# Patient Record
Sex: FEMALE | Race: WHITE | Employment: OTHER | URBAN - METROPOLITAN AREA
[De-identification: names, ages, dates, MRNs, and addresses within clinical notes are randomized per-mention and may not be internally consistent; named-entity substitution may affect disease eponyms.]

---

## 2022-03-27 ENCOUNTER — APPOINTMENT (OUTPATIENT)
Dept: CT IMAGING | Age: 87
End: 2022-03-27
Attending: EMERGENCY MEDICINE
Payer: MEDICARE

## 2022-03-27 ENCOUNTER — HOSPITAL ENCOUNTER (OUTPATIENT)
Age: 87
Setting detail: OBSERVATION
Discharge: HOME HEALTH | End: 2022-03-29
Attending: EMERGENCY MEDICINE | Admitting: HOSPITALIST
Payer: MEDICARE

## 2022-03-27 ENCOUNTER — APPOINTMENT (OUTPATIENT)
Dept: GENERAL RADIOLOGY | Age: 87
End: 2022-03-27
Attending: EMERGENCY MEDICINE
Payer: MEDICARE

## 2022-03-27 DIAGNOSIS — S00.03XA CONTUSION OF SCALP, INITIAL ENCOUNTER: Primary | ICD-10-CM

## 2022-03-27 DIAGNOSIS — R09.02 HYPOXIA: ICD-10-CM

## 2022-03-27 DIAGNOSIS — S22.32XA CLOSED FRACTURE OF ONE RIB OF LEFT SIDE, INITIAL ENCOUNTER: ICD-10-CM

## 2022-03-27 DIAGNOSIS — S20.212A CONTUSION OF LEFT CHEST WALL, INITIAL ENCOUNTER: ICD-10-CM
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
## 2022-03-27 LAB
ALBUMIN SERPL-MCNC: 3.6 G/DL (ref 3.5–5)
ALBUMIN/GLOB SERPL: 0.8 {RATIO} (ref 1.1–2.2)
ALP SERPL-CCNC: 76 U/L (ref 45–117)
ALT SERPL-CCNC: 24 U/L (ref 12–78)
ANION GAP SERPL CALC-SCNC: 3 MMOL/L (ref 5–15)
AST SERPL-CCNC: 50 U/L (ref 15–37)
BASOPHILS # BLD: 0 K/UL (ref 0–0.1)
BASOPHILS NFR BLD: 1 % (ref 0–1)
BILIRUB SERPL-MCNC: 0.4 MG/DL (ref 0.2–1)
BUN SERPL-MCNC: 23 MG/DL (ref 6–20)
BUN/CREAT SERPL: 32 (ref 12–20)
CALCIUM SERPL-MCNC: 8.9 MG/DL (ref 8.5–10.1)
CHLORIDE SERPL-SCNC: 100 MMOL/L (ref 97–108)
CO2 SERPL-SCNC: 33 MMOL/L (ref 21–32)
COMMENT, HOLDF: NORMAL
CREAT SERPL-MCNC: 0.72 MG/DL (ref 0.55–1.02)
DIFFERENTIAL METHOD BLD: NORMAL
EOSINOPHIL # BLD: 0 K/UL (ref 0–0.4)
EOSINOPHIL NFR BLD: 1 % (ref 0–7)
ERYTHROCYTE [DISTWIDTH] IN BLOOD BY AUTOMATED COUNT: 13.3 % (ref 11.5–14.5)
GLOBULIN SER CALC-MCNC: 4.6 G/DL (ref 2–4)
GLUCOSE SERPL-MCNC: 93 MG/DL (ref 65–100)
HCT VFR BLD AUTO: 41.2 % (ref 35–47)
HGB BLD-MCNC: 13.3 G/DL (ref 11.5–16)
IMM GRANULOCYTES # BLD AUTO: 0 K/UL (ref 0–0.04)
IMM GRANULOCYTES NFR BLD AUTO: 0 % (ref 0–0.5)
LYMPHOCYTES # BLD: 1 K/UL (ref 0.8–3.5)
LYMPHOCYTES NFR BLD: 26 % (ref 12–49)
MCH RBC QN AUTO: 30.9 PG (ref 26–34)
MCHC RBC AUTO-ENTMCNC: 32.3 G/DL (ref 30–36.5)
MCV RBC AUTO: 95.8 FL (ref 80–99)
MONOCYTES # BLD: 0.3 K/UL (ref 0–1)
MONOCYTES NFR BLD: 9 % (ref 5–13)
NEUTS SEG # BLD: 2.5 K/UL (ref 1.8–8)
NEUTS SEG NFR BLD: 63 % (ref 32–75)
NRBC # BLD: 0 K/UL (ref 0–0.01)
NRBC BLD-RTO: 0 PER 100 WBC
PLATELET # BLD AUTO: 174 K/UL (ref 150–400)
PMV BLD AUTO: 11 FL (ref 8.9–12.9)
POTASSIUM SERPL-SCNC: 4.1 MMOL/L (ref 3.5–5.1)
PROCALCITONIN SERPL-MCNC: <0.05 NG/ML
PROT SERPL-MCNC: 8.2 G/DL (ref 6.4–8.2)
RBC # BLD AUTO: 4.3 M/UL (ref 3.8–5.2)
SAMPLES BEING HELD,HOLD: NORMAL
SODIUM SERPL-SCNC: 136 MMOL/L (ref 136–145)
WBC # BLD AUTO: 3.9 K/UL (ref 3.6–11)

## 2022-03-27 PROCEDURE — 71101 X-RAY EXAM UNILAT RIBS/CHEST: CPT

## 2022-03-27 PROCEDURE — 80053 COMPREHEN METABOLIC PANEL: CPT

## 2022-03-27 PROCEDURE — 74011250636 HC RX REV CODE- 250/636: Performed by: EMERGENCY MEDICINE

## 2022-03-27 PROCEDURE — 85025 COMPLETE CBC W/AUTO DIFF WBC: CPT

## 2022-03-27 PROCEDURE — 65390000012 HC CONDITION CODE 44 OBSERVATION

## 2022-03-27 PROCEDURE — 65270000029 HC RM PRIVATE

## 2022-03-27 PROCEDURE — 96374 THER/PROPH/DIAG INJ IV PUSH: CPT

## 2022-03-27 PROCEDURE — 74011250637 HC RX REV CODE- 250/637: Performed by: HOSPITALIST

## 2022-03-27 PROCEDURE — 74011000250 HC RX REV CODE- 250: Performed by: HOSPITALIST

## 2022-03-27 PROCEDURE — 70450 CT HEAD/BRAIN W/O DYE: CPT

## 2022-03-27 PROCEDURE — 99285 EMERGENCY DEPT VISIT HI MDM: CPT

## 2022-03-27 PROCEDURE — 84145 PROCALCITONIN (PCT): CPT

## 2022-03-27 RX ORDER — SODIUM CHLORIDE 0.9 % (FLUSH) 0.9 %
5-40 SYRINGE (ML) INJECTION AS NEEDED
Status: DISCONTINUED | OUTPATIENT
Start: 2022-03-27 | End: 2022-03-29 | Stop reason: HOSPADM

## 2022-03-27 RX ORDER — ACETAMINOPHEN 650 MG/1
650 SUPPOSITORY RECTAL
Status: DISCONTINUED | OUTPATIENT
Start: 2022-03-27 | End: 2022-03-29 | Stop reason: HOSPADM

## 2022-03-27 RX ORDER — ACETAMINOPHEN 325 MG/1
650 TABLET ORAL EVERY 8 HOURS
Status: DISCONTINUED | OUTPATIENT
Start: 2022-03-27 | End: 2022-03-29 | Stop reason: HOSPADM

## 2022-03-27 RX ORDER — ENOXAPARIN SODIUM 100 MG/ML
30 INJECTION SUBCUTANEOUS DAILY
Status: DISCONTINUED | OUTPATIENT
Start: 2022-03-28 | End: 2022-03-27

## 2022-03-27 RX ORDER — LIDOCAINE 4 G/100G
1 PATCH TOPICAL EVERY 24 HOURS
Status: DISCONTINUED | OUTPATIENT
Start: 2022-03-27 | End: 2022-03-29 | Stop reason: HOSPADM

## 2022-03-27 RX ORDER — ASCORBIC ACID 500 MG
500 TABLET ORAL DAILY
Status: DISCONTINUED | OUTPATIENT
Start: 2022-03-28 | End: 2022-03-29 | Stop reason: HOSPADM

## 2022-03-27 RX ORDER — POLYETHYLENE GLYCOL 3350 17 G/17G
17 POWDER, FOR SOLUTION ORAL DAILY PRN
Status: DISCONTINUED | OUTPATIENT
Start: 2022-03-27 | End: 2022-03-29 | Stop reason: HOSPADM

## 2022-03-27 RX ORDER — ONDANSETRON 4 MG/1
4 TABLET, ORALLY DISINTEGRATING ORAL
Status: DISCONTINUED | OUTPATIENT
Start: 2022-03-27 | End: 2022-03-29 | Stop reason: HOSPADM

## 2022-03-27 RX ORDER — ACETAMINOPHEN 325 MG/1
650 TABLET ORAL
Status: DISCONTINUED | OUTPATIENT
Start: 2022-03-27 | End: 2022-03-29 | Stop reason: HOSPADM

## 2022-03-27 RX ORDER — ONDANSETRON 2 MG/ML
4 INJECTION INTRAMUSCULAR; INTRAVENOUS
Status: DISCONTINUED | OUTPATIENT
Start: 2022-03-27 | End: 2022-03-29 | Stop reason: HOSPADM

## 2022-03-27 RX ORDER — KETOROLAC TROMETHAMINE 30 MG/ML
15 INJECTION, SOLUTION INTRAMUSCULAR; INTRAVENOUS
Status: COMPLETED | OUTPATIENT
Start: 2022-03-27 | End: 2022-03-27

## 2022-03-27 RX ORDER — TRAMADOL HYDROCHLORIDE 50 MG/1
50 TABLET ORAL
Status: DISCONTINUED | OUTPATIENT
Start: 2022-03-27 | End: 2022-03-29 | Stop reason: HOSPADM

## 2022-03-27 RX ORDER — SODIUM CHLORIDE 0.9 % (FLUSH) 0.9 %
5-40 SYRINGE (ML) INJECTION EVERY 8 HOURS
Status: DISCONTINUED | OUTPATIENT
Start: 2022-03-27 | End: 2022-03-29 | Stop reason: HOSPADM

## 2022-03-27 RX ORDER — ESCITALOPRAM OXALATE 10 MG/1
10 TABLET ORAL EVERY EVENING
Status: DISCONTINUED | OUTPATIENT
Start: 2022-03-27 | End: 2022-03-29 | Stop reason: HOSPADM

## 2022-03-27 RX ORDER — KETOROLAC TROMETHAMINE 10 MG/1
10 TABLET, FILM COATED ORAL
Qty: 16 TABLET | Refills: 0 | Status: SHIPPED | OUTPATIENT
Start: 2022-03-27 | End: 2022-03-29

## 2022-03-27 RX ADMIN — ESCITALOPRAM 10 MG: 10 TABLET, FILM COATED ORAL at 18:16

## 2022-03-27 RX ADMIN — ACETAMINOPHEN 650 MG: 325 TABLET ORAL at 16:40

## 2022-03-27 RX ADMIN — KETOROLAC TROMETHAMINE 15 MG: 30 INJECTION, SOLUTION INTRAMUSCULAR; INTRAVENOUS at 13:02

## 2022-03-27 RX ADMIN — ACETAMINOPHEN 650 MG: 325 TABLET ORAL at 21:40

## 2022-03-27 RX ADMIN — SODIUM CHLORIDE, PRESERVATIVE FREE 10 ML: 5 INJECTION INTRAVENOUS at 17:27

## 2022-03-27 RX ADMIN — SODIUM CHLORIDE, PRESERVATIVE FREE 10 ML: 5 INJECTION INTRAVENOUS at 21:40

## 2022-03-27 NOTE — PROGRESS NOTES
Has a final transfer review been performed? Yes    Reason for Admission: hypoxia secondary to rib fracture  Patient comes from: Home  Mental Status: Alert and oriented  ADL:partial assistance  Ambulation:ambulates with: walker  Pertinent Info/Safety Concerns: falls  COVID Status: Not Tested  MEWS Score: 2  Vitals:    03/27/22 1252 03/27/22 1352 03/27/22 1407 03/27/22 1622   BP: 125/71 109/66 120/64 137/79   Pulse: 84 76 77 82   Resp: 21 15 14 24   Temp:    98.9 °F (37.2 °C)   SpO2: 90% 91% 92% 93%   Weight:         Transport mode:ED stretcher    TRANSFER - OUT REPORT:    Report consisted of patient's Situation, Background, Assessment and   Recommendations(SBAR).      Lines:   Peripheral IV 03/27/22 Right Antecubital (Active)

## 2022-03-27 NOTE — PROGRESS NOTES
TRANSFER - IN REPORT:    Verbal report received from WALLACE Welsh on [address]  being received from ED for routine progression of care      Report consisted of patients Situation, Background, Assessment and   Recommendations(SBAR). Information from the following report(s) SBAR, Kardex, ED Summary and MAR was reviewed with the receiving nurse. Opportunity for questions and clarification was provided. Assessment completed upon patients arrival to unit and care assumed.

## 2022-03-27 NOTE — ED NOTES
Left message to call back, please relay message below. Pt is to  prescription as soon as possible, and also sip on gatorade until midnight. Please ask which pharmacy we should send it to    RN called Diya W Nathen Barber to inform of sbar note in.

## 2022-03-27 NOTE — ED PROVIDER NOTES
This is a 80-year-old female who states that this morning she accidentally tripped and fell. She struck the top of her head but does not think she was knocked out. She is complaining of a large hematoma on the scalp. She is also complaining of pain over the left lower ribs and thinks she may have broken a rib. She does not remember actually striking her chest.  She denies any back pain. There is no shortness of breath. She denies any nausea or vomiting. She has no other acute complaint or injury secondary to the fall including pain in her hips or back or extremities.            Past Medical History:   Diagnosis Date    Arthritis     Coagulation defects 1969    BLEEDS EASILY; TRANSFUSED AFTER HYSTERECTOMY    GERD (gastroesophageal reflux disease)     History and physical examination, occupation     Hypertension     Unspecified adverse effect of anesthesia     AGGITATION, ROOM SPINNING, O/P REQUIRED OVERNIGHT STAY    Unspecified adverse effect of anesthesia 1970s    STOPPED BREATHING DURING BREAST BIOPSY    Vertigo        Past Surgical History:   Procedure Laterality Date    BREAST SURGERY PROCEDURE UNLISTED  X5 R, X1 L  1970s    CYSTS     HX GI  1996    ESOPHAGEAL DILATION    HX HEENT  2006    CATARACTS   R&L    HX HYSTERECTOMY  1969    HX ORTHOPAEDIC  2000    R TOTAL KNEE         Family History:   Problem Relation Age of Onset    Cancer Father     Other Sister         HAD A NERVOUS BREAKDOWN    Emphysema Brother     Bipolar Disorder Son        Social History     Socioeconomic History    Marital status:      Spouse name: Not on file    Number of children: Not on file    Years of education: Not on file    Highest education level: Not on file   Occupational History    Not on file   Tobacco Use    Smoking status: Never Smoker    Smokeless tobacco: Not on file   Substance and Sexual Activity    Alcohol use: No    Drug use: Not on file    Sexual activity: Not on file   Other Topics Concern    Not on file   Social History Narrative    Not on file     Social Determinants of Health     Financial Resource Strain:     Difficulty of Paying Living Expenses: Not on file   Food Insecurity:     Worried About Running Out of Food in the Last Year: Not on file    Julio of Food in the Last Year: Not on file   Transportation Needs:     Lack of Transportation (Medical): Not on file    Lack of Transportation (Non-Medical): Not on file   Physical Activity:     Days of Exercise per Week: Not on file    Minutes of Exercise per Session: Not on file   Stress:     Feeling of Stress : Not on file   Social Connections:     Frequency of Communication with Friends and Family: Not on file    Frequency of Social Gatherings with Friends and Family: Not on file    Attends Anabaptism Services: Not on file    Active Member of [address] [place] [address] or Organizations: Not on file    Attends Club or Organization Meetings: Not on file    Marital Status: Not on file   Intimate Partner Violence:     Fear of Current or Ex-Partner: Not on file    Emotionally Abused: Not on file    Physically Abused: Not on file    Sexually Abused: Not on file   Housing Stability:     Unable to Pay for Housing in the Last Year: Not on file    Number of [place] in the Last Year: Not on file    Unstable Housing in the Last Year: Not on file         ALLERGIES: Bactrim ds [sulfamethoxazole-trimethoprim], Ciprofloxacin, and Other medication    Review of Systems   Constitutional: Negative for activity change, appetite change and fatigue. HENT: Negative for ear pain, facial swelling, sore throat and trouble swallowing. Contusion   Eyes: Negative for pain, discharge and visual disturbance. Respiratory: Negative for chest tightness, shortness of breath and wheezing. Chest wall pain left   Cardiovascular: Negative for chest pain and palpitations.    Gastrointestinal: Negative for abdominal pain, blood in stool, nausea and vomiting. Genitourinary: Negative for difficulty urinating, flank pain and hematuria. Musculoskeletal: Negative for arthralgias, joint swelling, myalgias and neck pain. Skin: Negative for color change and rash. Neurological: Negative for dizziness, weakness, numbness and headaches. Hematological: Negative for adenopathy. Does not bruise/bleed easily. Psychiatric/Behavioral: Negative for behavioral problems, confusion and sleep disturbance. All other systems reviewed and are negative. Vitals:    03/27/22 1118   BP: (!) 212/88   Pulse: 95   Resp: 20   Temp: 98.2 °F (36.8 °C)   SpO2: 92%   Weight: 48.6 kg (107 lb 2.3 oz)            Physical Exam  Constitutional:       General: She is in acute distress ( Patient in mild distress with pain on the top of the head as well as pain in the left lateral chest). Appearance: She is not ill-appearing or diaphoretic. Comments: This is a 80-year-old female who presents with pain in her left lateral chest and contusion of her head from a fall. Vital signs are as noted. HENT:      Head:      Comments: Large hematoma over the left occipital parietal scalp. No bleeding noted. Left Ear: External ear normal.      Nose: Nose normal.      Mouth/Throat:      Mouth: Mucous membranes are moist.   Eyes:      Extraocular Movements: Extraocular movements intact. Conjunctiva/sclera: Conjunctivae normal.      Pupils: Pupils are equal, round, and reactive to light. Cardiovascular:      Rate and Rhythm: Normal rate and regular rhythm. Pulses: Normal pulses. Heart sounds: Normal heart sounds. Pulmonary:      Effort: Pulmonary effort is normal. No respiratory distress. Chest:      Chest wall: Tenderness ( Left lateral inferior chest.  No crepitus is noted. No deformity is noted.) present. Abdominal:      General: Abdomen is flat. There is no distension. Palpations: Abdomen is soft. Tenderness: There is no abdominal tenderness. There is no guarding. Musculoskeletal:         General: No swelling, tenderness, deformity or signs of injury. Cervical back: Normal range of motion and neck supple. No tenderness. Skin:     General: Skin is warm and dry. Findings: No bruising. Neurological:      General: No focal deficit present. Mental Status: She is alert and oriented to person, place, and time. Cranial Nerves: No cranial nerve deficit. Sensory: No sensory deficit. Motor: No weakness. Psychiatric:         Mood and Affect: Mood normal.         Behavior: Behavior normal.         Thought Content: Thought content normal.          MDM  Number of Diagnoses or Management Options  Contusion of left chest wall, initial encounter: new and requires workup  Contusion of scalp, initial encounter: new and requires workup  Fall, initial encounter: new and requires workup  Hypoxia: new and requires workup     Amount and/or Complexity of Data Reviewed  Tests in the radiology section of CPT®: ordered and reviewed  Decide to obtain previous medical records or to obtain history from someone other than the patient: yes  Review and summarize past medical records: yes    Risk of Complications, Morbidity, and/or Mortality  Presenting problems: high  Diagnostic procedures: high  Management options: moderate    Patient Progress  Patient progress: stable         Procedures    This is a 60-year-old female who fell this morning standing on the floor. She tripped. She complains of a hematoma on the head and some local discomfort as well as pain in the left lateral chest.  She is not short of breath. Will evaluate with CT and x-rays. I have discussed all findings with the patient and family member. The CT rib films failed to demonstrate any acute process. Patient appears to be in a moderate degree of distress with pain in her left ribs. Began and they are quite tender. It reproduces her pain.   We will discharge her with some ice compresses to the head and the chest 4 times daily, some Toradol, and to take a deep breath every hour. She will be given head injury instructions and will follow up with her own physician for continued difficulty. Patient lives alone. Family members here are both not able to care for the patient. They are very concerned about sending this patient home. Her sats are running in the 89 to 91% range with the chest contusion. Patient will need to be admitted and then case management will need to follow her for some type of placement or home arrangements for when she is discharged. Perfect Serve Consult for Admission  2:23 PM    ED Room Number: BI30/38  Patient Name and age:  Derek Ndiaye 80 y.o.  female  Working Diagnosis:   1. Contusion of scalp, initial encounter    2. Contusion of left chest wall, initial encounter    3. Fall, initial encounter    4.  Hypoxia        COVID-19 Suspicion:  no  Sepsis present:  no  Reassessment needed: no  Code Status:  Full Code  Readmission: no  Isolation Requirements:  no  Recommended Level of Care:  telemetry  Department:[facility] Adult ED - 21   Other:

## 2022-03-27 NOTE — ED TRIAGE NOTES
Pt arrives via EMS with chief complaint of GLF. Pt complaining of left rib pain and headache. Takes aspirin daily. Pt has a hematoma to back of head.

## 2022-03-27 NOTE — H&P
[address] [facility] Adult  Hospitalist Group  History and Physical    Date of Service:  3/27/2022  Primary Care Provider: Luisa Carpio MD  Source of information: The patient    Chief Complaint: Fall      History of Presenting Illness:   Char Mckeon is a 80 y.o. female who presents with fall and hypoxia     This is a 77-year-old female who states that this morning she accidentally tripped and fell. She struck the top of her head but does not think she was knocked out. She is complaining of a large hematoma on the scalp. She is also complaining of pain over the left lower ribs and thinks she may have broken a rib. She does not remember actually striking her chest.  She denies any back pain. There is no shortness of breath. She denies any nausea or vomiting. She has no other acute complaint or injury secondary to the fall including pain in her hips or back or extremities. In the ED,noticed  hypoxia around 88 to 89% on room air. Per daughter, patient lives at home by herself       REVIEW OF SYSTEMS:  General: No fever, no change of appetite  HEENT: hpi,  no nose discharge, no hearing changes   RES: no wheezing,hpi   CVS: no cp, no palpitation.   Muscular: no joint swelling, no muscle pain, no leg swelling  Skin: no rash, no itching   GI: no vomiting, no diarrhea  : no dysuria, no hematuria  Hemo: no gum bleeding, no petechial   Neuro: no sensation changes, no focal weakness   Endo: no polydipsia   Psych: denied depression     Past Medical History:   Diagnosis Date    Arthritis     Coagulation defects 1969    BLEEDS EASILY; TRANSFUSED AFTER HYSTERECTOMY    GERD (gastroesophageal reflux disease)     History and physical examination, occupation     Hypertension     Unspecified adverse effect of anesthesia     AGGITATION, ROOM SPINNING, O/P REQUIRED OVERNIGHT STAY    Unspecified adverse effect of anesthesia 1970s    STOPPED BREATHING DURING BREAST BIOPSY    Vertigo       Past Surgical History: Procedure Laterality Date    BREAST SURGERY PROCEDURE UNLISTED  X5 R, X1 L  1970s    CYSTS     HX GI  1996    ESOPHAGEAL DILATION    HX HEENT  2006    CATARACTS   R&L    HX HYSTERECTOMY  1969    HX ORTHOPAEDIC  2000    R TOTAL KNEE     Prior to Admission medications    Medication Sig Start Date End Date Taking? Authorizing Provider   ketorolac (TORADOL) 10 mg tablet Take 1 Tablet by mouth every six (6) hours as needed for Pain. 3/27/22  Yes Martha Laureano MD   hydrocodone-acetaminophen (LORTAB) 5-500 mg per tablet Take 1 Tab by mouth every four (4) hours as needed for Pain. 6/10/11   Genny Ribeiro MD   triamterene-hydrochlorothiazide (DYAZIDE) 37.5-25 mg per capsule Take  by mouth daily. Provider, Historical   aspirin 81 mg tablet Take 81 mg by mouth daily. 6/6/11   Provider, Historical   cholecalciferol, vitamin d3, (VITAMIN D) 1,000 unit tablet Take  by mouth daily. Provider, Historical   ascorbic acid (VITAMIN C) 500 mg tablet Take  by mouth daily. 6/6/11   Provider, Historical   meclizine (ANTIVERT) 25 mg tablet Take  by mouth as needed. USUALLY TAKES ONLY AT NIGHT BECAUSE IT MAKES HER GROGGY.  6/6/11   Provider, Historical   amoxicillin (AMOXIL) 500 mg capsule Take 500 mg by mouth. PRIOR TO DENTAL WORK TAKES 4 CAPS     Provider, Historical     Allergies   Allergen Reactions    Bactrim Ds [Sulfamethoxazole-Trimethoprim] Other (comments)     PT STATES SHE WAS TAKEN OFF THIS MED BECAUSE IT WAS TOO STRONG.  Ciprofloxacin Other (comments)     AGGITATION    Other Medication Other (comments)     RECENT ANTIBIOTIC WAS \"WAY TOO STRONG, KEPT ME AWAKE\"      Family History   Problem Relation Age of Onset    Cancer Father     Other Sister         HAD A NERVOUS BREAKDOWN    Emphysema Brother     Bipolar Disorder Son       Social History:  reports that she has never smoked. She does not have any smokeless tobacco history on file. She reports that she does not drink alcohol.      Family and social history were personally reviewed, all pertinent and relevant details are outlined as above. Objective:     Visit Vitals  /64   Pulse 77   Temp 98.2 °F (36.8 °C)   Resp 14   Wt 48.6 kg (107 lb 2.3 oz)   SpO2 92%    O2 Flow Rate (L/min): 2 l/min O2 Device: Nasal cannula    PHYSICAL EXAM:   General: Awake, answer simple questions speech is a soft appears to be stated age  HEENT: PEERL, EOMI, Large hematoma over the left occipital parietal scalp. No bleeding noted. Neck: Supple, no JVD, no meningeal signs  Chest: Left chest wall tenderness, no deformity, breath sounds decreased in the left lung  CVS: RRR, S1 S2 heard, no murmurs/rubs/gallops  Abd: Soft, non-tender, non-distended, +bowel sounds   Ext: No clubbing, no cyanosis, no edema  Neuro/Psych: Pleasant mood and affect, CN 2-12 grossly intact, sensory grossly within normal limit, Strength 5/5 in all extremities, DTR 1+ x 4  Cap refill: Brisk, less than 3 seconds  Pulses: 2+, symmetric in all extremities  Skin: Warm, dry, without rashes or lesions    Data Review: All diagnostic labs and studies have been reviewed. Abnormal Labs Reviewed - No abnormal labs to display    All Micro Results     Procedure Component Value Units Date/Time    URINE CULTURE HOLD SAMPLE [[id number]]     Order Status: Sent Specimen: Urine           IMAGING:   XR RIBS LT W PA CXR MIN 3 V   Final Result   and eighth ribs. IMPRESSION:       Left seventh and eighth rib acute fractures. No pneumothorax. Left pneumonia   versus atelectasis versus effusion. Recommend followup PA and lateral chest   views in 8-10 weeks to ensure resolution. CT HEAD WO CONT   Final Result   Left vertex scalp hematoma. No fracture or acute intracranial abnormality.                  ECG/ECHO:    Results for orders placed or performed during the hospital encounter of 06/06/11   EKG, 12 LEAD, INITIAL   Result Value Ref Range    Ventricular Rate 70 BPM    Atrial Rate 70 BPM    P-R Interval 156 ms QRS Duration 82 ms    Q-T Interval 398 ms    QTC Calculation (Bezet) 429 ms    Calculated P Axis 30 degrees    Calculated R Axis -3 degrees    Calculated T Axis 40 degrees    Diagnosis       Normal sinus rhythm  Cannot rule out Inferior infarct (cited on or before 06-JUN-2011)  When compared with ECG of 02-OCT-2000 13:19,  No significant change was found with  slight inferior ST elevation probably   representing early repolarization, a normal variant  Confirmed by Rufino Aguirre ([id number]) on 6/7/2011 7:05:23 AM        Assessment:   Given the patient's current clinical presentation, there is a high level of concern for decompensation if discharged from the emergency department. Complex decision making was performed, which includes reviewing the patient's available past medical records, laboratory results, and imaging studies. Active Problems:    Hypoxia (3/27/2022)      Plan:   1. Hypoxia: Possible due to left lung contraction, plus atelectasis due to rib fracture, clinical does not sent she has pneumonia. Will supportive care with O2, pain control with lidocaine patch, ATC Tylenol, tramadol as needed, try to avoid narcotic in elderly patient. Incentive spirometry  2. Left chest wall ribs fracture: Plan as above  3. Head injury, concussion of the head with a large scalp hematoma: Supportive care holding home aspirin. DIET: ADULT DIET Dysphagia - Soft & Bite Sized  ADULT ORAL NUTRITION SUPPLEMENT Breakfast, Lunch, Dinner; Standard High Calorie/High Protein   ISOLATION PRECAUTIONS: There are currently no Active Isolations  CODE STATUS: Full Code   DVT PROPHYLAXIS: SCDs  FUNCTIONAL STATUS PRIOR TO HOSPITALIZATION: Capable of only limited self-care; confined to bed or chair likely more than 50% of waking hours. EARLY MOBILITY ASSESSMENT: Recommend an assessment from physical therapy and/or occupational therapy  ANTICIPATED DISCHARGE: 24-48 hours.   EMERGENCY CONTACT/SURROGATE DECISION MAKER: pt's daughter at bedside, updated     CRITICAL CARE WAS PERFORMED FOR THIS ENCOUNTER: NO.      Signed By: Alba Libman, MD     March 27, 2022         Please note that this dictation may have been completed with Dragon, the computer voice recognition software. Quite often unanticipated grammatical, syntax, homophones, and other interpretive errors are inadvertently transcribed by the computer software. Please disregard these errors. Please excuse any errors that have escaped final proofreading.

## 2022-03-27 NOTE — DISCHARGE INSTRUCTIONS
You will need to take the medications if needed for severe pain. Otherwise use Tylenol for discomfort. Cool compresses to the areas of the chest that are bothering you several times daily. Take a good deep breath every hour or 2 just to keep your lungs open and draining. Follow-up with your own physician if continued difficulty  or see your physician if symptoms worsen. Discharge Instructions       PATIENT ID: Miko Vallejo  MRN: [id number]   YOB: 1926    DATE OF ADMISSION: 3/27/2022 11:11 AM    DATE OF DISCHARGE: 3/29/2022    PRIMARY CARE PROVIDER: Job Gamez MD     ATTENDING PHYSICIAN: Miguel Malone MD  DISCHARGING PROVIDER: Alcon Noriega MD    To contact this individual call [phone] and ask the  to page. If unavailable ask to be transferred the Adult Hospitalist Department. DISCHARGE DIAGNOSES   Hypoxia possible due to atelectasis due to ribs fracture   Acute left 7th and 8th ribs fracture secondary to fall   Large scalp hematoma due to fall  Possible dysphagia    CONSULTATIONS: None    PROCEDURES/SURGERIES: * No surgery found *    PENDING TEST RESULTS:   At the time of discharge the following test results are still pending: None    FOLLOW UP APPOINTMENTS:   Follow-up Information     Follow up With Specialties Details [facility] [place] [facility] [place] [address]  this is your home health agency, they will contact you within 24 hours from your discharge date.  [facility] Sonal Gamez MD Internal Medicine In one week  [address] [place] [address],[address]  [phone]          ADDITIONAL CARE RECOMMENDATIONS:      DIET: Dysphagia soft and easy to bite     ACTIVITY: Activity as tolerated    WOUND CARE: None    EQUIPMENT needed: None        DISCHARGE MEDICATIONS:   See Medication Reconciliation Form    · It is important that you take the medication exactly as they are prescribed. · Keep your medication in the bottles provided by the pharmacist and keep a list of the medication names, dosages, and times to be taken in your wallet. · Do not take other medications without consulting your doctor. NOTIFY YOUR PHYSICIAN FOR ANY OF THE FOLLOWING:   Fever over 101 degrees for 24 hours. Chest pain, shortness of breath, fever, chills, nausea, vomiting, diarrhea, change in mentation, falling, weakness, bleeding. Severe pain or pain not relieved by medications. Or, any other signs or symptoms that you may have questions about.       DISPOSITION:  x  Home With:  x OT x PT x HH  RN       SNF/Inpatient Rehab/LTAC    Independent/assisted living    Hospice    Other:     CDMP Checked:   Yes x     PROBLEM LIST Updated:  Yes x       Signed:   Jared Carrillo MD  3/29/2022  8:46 AM

## 2022-03-28 PROBLEM — S22.32XA LEFT RIB FRACTURE: Status: ACTIVE | Noted: 2022-03-28

## 2022-03-28 LAB
APPEARANCE UR: CLEAR
BACTERIA URNS QL MICRO: NEGATIVE /HPF
BILIRUB UR QL: NEGATIVE
COLOR UR: ABNORMAL
EPITH CASTS URNS QL MICRO: ABNORMAL /LPF
GLUCOSE UR STRIP.AUTO-MCNC: NEGATIVE MG/DL
HGB UR QL STRIP: NEGATIVE
KETONES UR QL STRIP.AUTO: ABNORMAL MG/DL
LEUKOCYTE ESTERASE UR QL STRIP.AUTO: NEGATIVE
NITRITE UR QL STRIP.AUTO: NEGATIVE
PH UR STRIP: 5.5 [PH] (ref 5–8)
PROT UR STRIP-MCNC: NEGATIVE MG/DL
RBC #/AREA URNS HPF: ABNORMAL /HPF (ref 0–5)
SP GR UR REFRACTOMETRY: 1.02 (ref 1–1.03)
UR CULT HOLD, URHOLD: NORMAL
UROBILINOGEN UR QL STRIP.AUTO: 0.2 EU/DL (ref 0.2–1)
WBC URNS QL MICRO: ABNORMAL /HPF (ref 0–4)

## 2022-03-28 PROCEDURE — 77030037877 HC DRSG MEPILEX >48IN BORD MOLN -A

## 2022-03-28 PROCEDURE — 81001 URINALYSIS AUTO W/SCOPE: CPT

## 2022-03-28 PROCEDURE — 97116 GAIT TRAINING THERAPY: CPT

## 2022-03-28 PROCEDURE — 97535 SELF CARE MNGMENT TRAINING: CPT

## 2022-03-28 PROCEDURE — 65390000012 HC CONDITION CODE 44 OBSERVATION

## 2022-03-28 PROCEDURE — 74011250637 HC RX REV CODE- 250/637: Performed by: HOSPITALIST

## 2022-03-28 PROCEDURE — 97165 OT EVAL LOW COMPLEX 30 MIN: CPT

## 2022-03-28 PROCEDURE — 74011000250 HC RX REV CODE- 250: Performed by: HOSPITALIST

## 2022-03-28 PROCEDURE — G0378 HOSPITAL OBSERVATION PER HR: HCPCS

## 2022-03-28 PROCEDURE — 97161 PT EVAL LOW COMPLEX 20 MIN: CPT

## 2022-03-28 PROCEDURE — 92610 EVALUATE SWALLOWING FUNCTION: CPT | Performed by: SPEECH-LANGUAGE PATHOLOGIST

## 2022-03-28 RX ADMIN — SODIUM CHLORIDE, PRESERVATIVE FREE 10 ML: 5 INJECTION INTRAVENOUS at 14:36

## 2022-03-28 RX ADMIN — TRAMADOL HYDROCHLORIDE 50 MG: 50 TABLET ORAL at 08:43

## 2022-03-28 RX ADMIN — TRAMADOL HYDROCHLORIDE 50 MG: 50 TABLET ORAL at 02:15

## 2022-03-28 RX ADMIN — SODIUM CHLORIDE, PRESERVATIVE FREE 10 ML: 5 INJECTION INTRAVENOUS at 21:14

## 2022-03-28 RX ADMIN — ESCITALOPRAM 10 MG: 10 TABLET, FILM COATED ORAL at 18:03

## 2022-03-28 RX ADMIN — SODIUM CHLORIDE, PRESERVATIVE FREE 10 ML: 5 INJECTION INTRAVENOUS at 05:41

## 2022-03-28 RX ADMIN — ACETAMINOPHEN 650 MG: 325 TABLET ORAL at 21:02

## 2022-03-28 RX ADMIN — ACETAMINOPHEN 650 MG: 325 TABLET ORAL at 05:41

## 2022-03-28 RX ADMIN — ACETAMINOPHEN 650 MG: 325 TABLET ORAL at 14:35

## 2022-03-28 RX ADMIN — OXYCODONE HYDROCHLORIDE AND ACETAMINOPHEN 500 MG: 500 TABLET ORAL at 08:43

## 2022-03-28 NOTE — PROGRESS NOTES
Problem: Mobility Impaired (Adult and Pediatric)  Goal: *Acute Goals and Plan of Care (Insert Text)  Description: FUNCTIONAL STATUS PRIOR TO ADMISSION: Patient was modified independent using a single point cane for functional mobility. Uses transport chair outside of home; sponge bathes; typically up moving about home independently or with a cane; does own ADLs    HOME SUPPORT PRIOR TO ADMISSION: The patient lived with son and required modified independence for mobility. Daughter provided food that patient would defrost and heat; son not always present. Physical Therapy Goals  Initiated 3/28/2022  1. Patient will move from supine to sit and sit to supine  and roll side to side in bed with modified independence within 7 day(s). 2.  Patient will transfer from bed to chair and chair to bed with modified independence using the least restrictive device within 7 day(s). 3.  Patient will perform sit to stand with modified independence within 7 day(s). 4.  Patient will ambulate with modified independence for 100 feet with the least restrictive device within 7 day(s). 5.  Patient will ascend/descend 1 stairs with no handrail(s) with minimal assistance/contact guard assist within 7 day(s). Outcome: Progressing Towards Goal   PHYSICAL THERAPY EVALUATION  Patient: Rigo Adkins (63 y.o. female)  Date: 3/28/2022  Primary Diagnosis: Hypoxia [R09.02]        Precautions: fall         ASSESSMENT  Based on the objective data described below, the patient presents with decreased balance, strength and gait. She is having some rib pain from fracture. Main issue noted upon initial stand with patient demonstrating heavy posterior lean requiring assist to stand upright. Once she starts walking, more balanced and only contact guard assist.  Anticipate home with increased supervision initially for mobility.     Current Level of Function Impacting Discharge (mobility/balance): minimal assist; decreased standing balance (fear of falling?)    Functional Outcome Measure: The patient scored Total Score: 11/28 on the Tinetti outcome measure which is indicative of high fall risk. Other factors to consider for discharge: doesn't usually have much supervision      Patient will benefit from skilled therapy intervention to address the above noted impairments. PLAN :  Recommendations and Planned Interventions: bed mobility training, transfer training, gait training, therapeutic exercises, patient and family training/education, and therapeutic activities      Frequency/Duration: Patient will be followed by physical therapy:  5 times a week to address goals. Recommendation for discharge: (in order for the patient to meet his/her long term goals)  Physical therapy at least 2 days/week in the home AND ensure assist and/or supervision for safety with mobility    This discharge recommendation:  Has been made in collaboration with the  case management    IF patient discharges home will need the following DME: patient owns DME required for discharge         SUBJECTIVE:   Patient stated Don't let me fall.     OBJECTIVE DATA SUMMARY:   HISTORY:    Past Medical History:   Diagnosis Date    Arthritis     Coagulation defects 1969    BLEEDS EASILY; TRANSFUSED AFTER HYSTERECTOMY    GERD (gastroesophageal reflux disease)     History and physical examination, occupation     Hypertension     Unspecified adverse effect of anesthesia     AGGITATION, ROOM SPINNING, O/P REQUIRED OVERNIGHT STAY    Unspecified adverse effect of anesthesia 1970s    STOPPED BREATHING DURING BREAST BIOPSY    Vertigo      Past Surgical History:   Procedure Laterality Date    BREAST SURGERY PROCEDURE UNLISTED  X5 R, X1 L  1970s    CYSTS     HX GI  1996    ESOPHAGEAL DILATION    HX HEENT  2006    CATARACTS   R&L    HX HYSTERECTOMY  1969    HX ORTHOPAEDIC  2000    R TOTAL KNEE       Personal factors and/or comorbidities impacting plan of care:     [address] [place] [address] Environment: Private residence  # Steps to Enter: 1  Rails to [employer]: No  One/Two Story Residence: Two story, live on 1st floor  Living Alone: No  [place]: Child(carolin),Other Family Member(s)  Patient Expects to be Discharged to[de-identified] Skilled nursing facility  Current DME Used/Available at Home: Elverna Corti, rollator,Walker, rolling,Other (comment),Cane, straight,Commode, bedside (transport chair for appointments)    EXAMINATION/PRESENTATION/DECISION MAKING:   Critical Behavior:  Neurologic State: Alert  Orientation Level: Oriented X4  Cognition: Memory loss,Follows commands     Hearing: Auditory  Auditory Impairment: Hearing aid(s)  Hearing Aids/Status: At home  Range Of Motion:  AROM: Within functional limits                       Strength:    Strength: Generally decreased, functional                    Tone & Sensation:   Tone: Normal              Sensation: Intact               Coordination:  Coordination: Generally decreased, functional  Vision: -glasses     Functional Mobility:  Bed Mobility:     Supine to Sit: Stand-by assistance; Additional time;Bed Modified     Scooting: Additional time  Transfers:  Sit to Stand: Minimum assistance  Stand to Sit: Minimum assistance  Stand Pivot Transfers: Minimum assistance                    Balance:   Sitting: Impaired; Without support  Sitting - Static: Good (unsupported)  Sitting - Dynamic: Good (unsupported)  Standing: Impaired; With support  Standing - Static: Constant support; Fair  Standing - Dynamic : Constant support; Fair  Ambulation/Gait Training:  Distance (ft): 40 Feet (ft)  Assistive Device: Gait belt;Walker, rolling  Ambulation - Level of Assistance: Contact guard assistance     Gait Description (WDL): Exceptions to WDL  Gait Abnormalities: Decreased step clearance        Base of Support: Narrowed; Center of gravity altered     Speed/[place]: Pace decreased (<100 feet/min)  Step Length: Right shortened;Left shortened            Functional Measure:  Tinetti test:    Sitting Balance: 1  Arises: 0  Attempts to Rise: 0  Immediate Standing Balance: 0  Standing Balance: 0  Nudged: 0  Eyes Closed: 0  Turn 360 Degrees - Continuous/Discontinuous: 0  Turn 360 Degrees - Steady/Unsteady: 0  Sitting Down: 1  Balance Score: 2 Balance total score  Indication of Gait: 1  R Step Length/Height: 1  L Step Length/Height: 1  R Foot Clearance: 1  L Foot Clearance: 1  Step Symmetry: 1  Step Continuity: 1  Path: 1  Trunk: 0  Walking Time: 1  Gait Score: 9 Gait total score  Total Score: 11/28 Overall total score         Tinetti Tool Score Risk of Falls  <19 = High Fall Risk  19-24 = Moderate Fall Risk  25-28 = Low Fall Risk  Tinetti ME. Performance-Oriented Assessment of Mobility Problems in Elderly Patients. [address] 66; [id number].  (Scoring Description: PT Bulletin Feb. 10, 1993)    Older adults: Gurjit Boyer et al, 2009; n = 1000 [facility] elderly evaluated with ABC, AFTAB, ADL, and IADL)  · Mean AFTAB score for males aged 69-68 years = 26.21(3.40)  · Mean AFTAB score for females age 69-68 years = 25.16(4.30)  · Mean AFTAB score for males over 80 years = 23.29(6.02)  · Mean AFTAB score for females over 80 years = 17.20(8.32)        Physical Therapy Evaluation Charge Determination   History Examination Presentation Decision-Making   MEDIUM  Complexity : 1-2 comorbidities / personal factors will impact the outcome/ POC  LOW Complexity : 1-2 Standardized tests and measures addressing body structure, function, activity limitation and / or participation in recreation  LOW Complexity : Stable, uncomplicated  Other outcome measures Tinetti  HIGH       Based on the above components, the patient evaluation is determined to be of the following complexity level: LOW     Activity Tolerance:   Fair      After treatment patient left in no apparent distress:   Sitting in chair, Call bell within reach, and Caregiver / family present    COMMUNICATION/EDUCATION:   The patients plan of care was discussed with: Occupational therapist, Registered nurse, and Case management. Fall prevention education was provided and the patient/caregiver indicated understanding., Patient/family have participated as able in goal setting and plan of care. , and Patient/family agree to work toward stated goals and plan of care.     Thank you for this referral.  Alba Pendleton, PT   Time Calculation: 32 mins

## 2022-03-28 NOTE — PROGRESS NOTES
Problem: Self Care Deficits Care Plan (Adult)  Goal: *Acute Goals and Plan of Care (Insert Text)  Description:   FUNCTIONAL STATUS PRIOR TO ADMISSION: Patient was modified independent using a single point cane for functional mobility. Uses transport chair outside of home; sponge bathes; typically up moving about home independently or with a cane; does own ADLs including dressing and sponge baths. Children/grandchildren assist with IADL    HOME SUPPORT: The patient lived with son and required modified independence for mobility. Daughter provided food that patient would defrost and heat; son not always present. Occupational Therapy Goals  Initiated 3/28/2022  1. Patient will perform grooming with supervision/set-up within 7 day(s). 2.  Patient will perform upper body dressing with supervision/set-up within 7 day(s). 3.  Patient will perform lower body dressing with supervision/set-up within 7 day(s). 4.  Patient will perform all aspects of toileting with supervision/set-up within 7 day(s). 5.  Patient will utilize energy conservation techniques during functional activities with verbal cues within 7 day(s). Outcome: Not Met   OCCUPATIONAL THERAPY EVALUATION  Patient: Elsi Neal (60 y.o. female)  Date: 3/28/2022  Primary Diagnosis: Hypoxia [R09.02]        Precautions: Falls       ASSESSMENT  Based on the objective data described below, the patient presents with impaired balance, strength, and ability to complete ADL at baseline. Pt admitted after GLF resulting in 2 L rib fractures. Primarily limited in transfers and forward flexion d/t pain from rib fractures, patient has tendency for posterior learning when transferring to standing even with cueing. Able to complete LE dressing with up to Min A and extended time. Patient was highly independent prior to admission and has support from her family for all IADL.  Educated patient and granddaughter on modified dressing techniques and general safety techniques to implement in home environment. Patient will likely benefit from discharge home with family support for all transfers and IADl, if not then SNF. Current Level of Function Impacting Discharge (ADLs/self-care): up to Mod A ADL/mobility    Functional Outcome Measure: The patient scored Total: 70/100 on the Barthel Index outcome measure which is indicative of 30% impaired ability to care for basic self needs/dependency on others; inferred 80% dependency on others for instrumental ADLs. Other factors to consider for discharge: below baseline, fall risk, supportive family     Patient will benefit from skilled therapy intervention to address the above noted impairments. PLAN :  Recommendations and Planned Interventions: self care training, functional mobility training, therapeutic exercise, balance training, therapeutic activities, endurance activities, patient education, home safety training and family training/education    Frequency/Duration: Patient will be followed by occupational therapy 5 times a week to address goals. Recommendation for discharge: (in order for the patient to meet his/her long term goals)  Occupational therapy at least 2 days/week in the home AND ensure assist and/or supervision for safety with ADL/IADL and all transfers    This discharge recommendation:  Has been made in collaboration with the attending provider and/or case management    IF patient discharges home will need the following DME: TBD pending progress         SUBJECTIVE:   Patient stated I look in the mirror and can't believe how old I look! Lisbet Penn    OBJECTIVE DATA SUMMARY:   HISTORY:   Past Medical History:   Diagnosis Date    Arthritis     Coagulation defects 1969    BLEEDS EASILY; TRANSFUSED AFTER HYSTERECTOMY    GERD (gastroesophageal reflux disease)     History and physical examination, occupation     Hypertension     Unspecified adverse effect of anesthesia     AGGITATION, ROOM SPINNING, O/P REQUIRED OVERNIGHT STAY    Unspecified adverse effect of anesthesia 1970s    STOPPED BREATHING DURING BREAST BIOPSY    Vertigo      Past Surgical History:   Procedure Laterality Date    BREAST SURGERY PROCEDURE UNLISTED  X5 R, X1 L  1970s    CYSTS     HX GI  1996    ESOPHAGEAL DILATION    HX HEENT  2006    CATARACTS   R&L    HX HYSTERECTOMY  1969    HX ORTHOPAEDIC  2000    R TOTAL KNEE       Expanded or extensive additional review of patient history:     Home Situation  Home Environment: Private residence  # Steps to Enter: 1  Rails to Enter: No  One/Two Story Residence: Two story, live on 1st floor  Living Alone: No  Support Systems: Child(carolin),Other Family Member(s)  Patient Expects to be Discharged to[de-identified] Skilled nursing facility  Current DME Used/Available at Home: Walker, rollator,Walker, rolling,Other (comment),Cane, straight,Commode, bedside (transport chair for appointments)  Tub or Shower Type: Other (comment) (Sponge bathes)    Hand dominance: Right    EXAMINATION OF PERFORMANCE DEFICITS:  Cognitive/Behavioral Status:           Perception: Appears intact  Perseveration: No perseveration noted  Safety/Judgement: Awareness of environment; Fall prevention    Skin: intact    Edema: none noted    Hearing: Auditory  Auditory Impairment: Hearing aid(s)  Hearing Aids/Status: At home    Vision/Perceptual:    Tracking: Able to track stimulus in all quadrants w/o difficulty                      Acuity: Within Defined Limits    Corrective Lenses: Glasses    Range of Motion:  AROM: Within functional limits                         Strength:  Strength: Generally decreased, functional                Coordination:  Coordination: Generally decreased, functional  Fine Motor Skills-Upper: Right Intact; Left Intact    Gross Motor Skills-Upper: Left Intact; Right Intact    Tone & Sensation:  Tone: Normal  Sensation: Intact                      Balance:  Sitting: Impaired; Without support  Sitting - Static: Good (unsupported)  Sitting - Dynamic: Good (unsupported)  Standing: Impaired; With support  Standing - Static: Constant support; Fair  Standing - Dynamic : Constant support; Fair    Functional Mobility and Transfers for ADLs:  Bed Mobility:  Supine to Sit: Stand-by assistance; Additional time;Bed Modified  Scooting: Additional time    Transfers:  Sit to Stand: Minimum assistance  Stand to Sit: Minimum assistance    ADL Assessment:  Feeding: Setup    Oral Facial Hygiene/Grooming: Supervision    Bathing: Minimum assistance    Upper Body Dressing: Minimum assistance    Lower Body Dressing: Minimum assistance    Toileting: Minimum assistance                ADL Intervention and task modifications:       Grooming  Grooming Assistance: Supervision  Position Performed: Seated in chair  Washing Face: Supervision  Brushing/Combing Hair: Supervision              Upper [address]: Minimum  assistance    Lower Body Dressing Assistance  Socks: Minimum assistance  Shoes with Elastic Laces: Minimum assistance  Position Performed: Seated edge of bed  Cues: Physical assistance         Cognitive Retraining  Safety/Judgement: Awareness of environment; Fall prevention    Functional Measure:  Barthel Index:    Bathin  Bladder: 10  Bowels: 10  Groomin  Dressin  Feeding: 10  Mobility: 10  Stairs: 5  Toilet Use: 5  Transfer (Bed to Chair and Back): 10  Total: 70/100        The Barthel ADL Index: Guidelines  1. The index should be used as a record of what a patient does, not as a record of what a patient could do. 2. The main aim is to establish degree of independence from any help, physical or verbal, however minor and for whatever reason. 3. The need for supervision renders the patient not independent. 4. A patient's performance should be established using the best available evidence.  Asking the patient, friends/relatives and nurses are the usual sources, but direct observation and common sense are also important. However direct testing is not needed. 5. Usually the patient's performance over the preceding 24-48 hours is important, but occasionally longer periods will be relevant. 6. Middle categories imply that the patient supplies over 50 per cent of the effort. 7. Use of aids to be independent is allowed. Nani Record., Barthel, D.W. ([id number]). Functional evaluation: the Barthel Index. [address] [facility] (14)2. Davie Galloway yoan ELISHA Auguste, Jasbir Corbett., Coleman Khalil., [facility], [facility] (1999). Measuring the change indisability after inpatient rehabilitation; comparison of the responsiveness of the Barthel Index and Functional [place] Measure. Journal of Neurology, Neurosurgery, and Psychiatry, 66(4), 007-789. JAZMINE Rodriguez, LAZARUS Munguia, & Anne-Marie Chavez M.A. (2004.) Assessment of post-stroke quality of life in cost-effectiveness studies: The usefulness of the Barthel Index and the EuroQoL-5D. Quality of Life Research, 15, 233-67         Occupational Therapy Evaluation Charge Determination   History Examination Decision-Making   MEDIUM Complexity : Expanded review of history including physical, cognitive and psychosocial  history  MEDIUM Complexity : 3-5 performance deficits relating to physical, cognitive , or psychosocial skils that result in activity limitations and / or participation restrictions LOW Complexity : No comorbidities that affect functional and no verbal or physical assistance needed to complete eval tasks       Based on the above components, the patient evaluation is determined to be of the following complexity level: LOW   Pain Rating:  Mild discomfort with movement    Activity Tolerance:   Fair and requires rest breaks    After treatment patient left in no apparent distress:    Sitting in chair and Caregiver / family present    COMMUNICATION/EDUCATION:   The patients plan of care was discussed with: Physical therapist and Registered nurse.      Home safety education was provided and the patient/caregiver indicated understanding., Patient/family have participated as able in goal setting and plan of care. , and Patient/family agree to work toward stated goals and plan of care. This patients plan of care is appropriate for delegation to [facility].     Thank you for this referral.  Jessica Smith OT  Time Calculation: 30 mins

## 2022-03-28 NOTE — PROGRESS NOTES
[facility] Adult  Hospitalist Group                                                                                          Hospitalist Progress Note  Yarely Leigh MD  Answering service: [phone] from in house phone        Date of Service:  3/28/2022  NAME:  Sienna Lantigua  :  10/8/1926  MRN:  [id number]      Admission Summary: This is a 49-year-old female who states that this morning she accidentally tripped and fell and hypoxic.  She struck the top of her head but does not think she was knocked out. Ama Mart is complaining of a large hematoma on the scalp.  She is also complaining of pain over the left lower ribs and thinks she may have broken a rib.  She does not remember actually striking her chest.  She denies any back pain. Stu Lindquist is no shortness of breath.  She denies any nausea or vomiting.  She has no other acute complaint or injury secondary to the fall including pain in her hips or back or extremities. In the ED, noticed  hypoxia around 88 to 89% on room air. Per daughter, patient lives at home by herself     Interval history / Subjective:     She said she has pain on the left side of chest,   Her grand daughter said she was choking when she drinks     Assessment & Plan:     Hypoxia possible due to atelectasis due to ribs fracture   -chest x ray left seventh and eighth rib acute fractures. No pneumothorax. Left pneumonia  versus atelectasis versus effusion. Recommend followup PA and lateral chest views in 8-10 weeks to ensure resolution.  -SpO2 92-95% on RA  -prn duo neb  -procalcitonin <0.05, no need abx at this time  -monitor pulse ox    Acute left 7th and 8th ribs fracture secondary to fall   -continue prn pain meds and incentive spirometry     Large scalp hematoma due to fall  -CT head left vertex scalp hematoma.  No fracture or acute intracranial abnormality.  -Fall precaution  -PT/OT    Possible dysphagia  -consult to speech therapy       Code status: Full Code  Prophylaxis: SCD  Care Plan discussed with: Patient, Nurse and CM  Anticipated Disposition: Home with home health      Hospital Problems  Never Reviewed          Codes Class Noted POA    Hypoxia ICD-10-CM: R09.02  ICD-9-CM: 799.02  3/27/2022 Unknown                Vital Signs:    Last 24hrs VS reviewed since prior progress note. Most recent are:  Visit Vitals  /73 (BP 1 Location: Left upper arm, BP Patient Position: At rest)   Pulse 69   Temp 98.3 °F (36.8 °C)   Resp 18   Wt 48.6 kg (107 lb 2.3 oz)   SpO2 92%       No intake or output data in the 24 hours ending 03/28/22 0825     Physical Examination:     I had a face to face encounter with this patient and independently examined them on 3/28/2022 as outlined below:          Constitutional:  No acute distress, cooperative, pleasant    ENT:  Oral mucosa moist, oropharynx benign. Resp:  Decrease bronchial breath sound bilaterally. No wheezing/rhonchi/rales. No accessory muscle use. CV:  Regular rhythm, normal rate, no murmurs, gallops, rubs    GI:  Soft, non distended, non tender. normoactive bowel sounds, no hepatosplenomegaly     Musculoskeletal:  No edema     Neurologic:  Moves all extremities. AAOx3, CN II-XII reviewed            Data Review:    Review and/or order of clinical lab test  Review and/or order of tests in the radiology section of CPT  Review and/or order of tests in the medicine section of CPT      Labs:     Recent Labs     03/27/22  1127   WBC 3.9   HGB 13.3   HCT 41.2        Recent Labs     03/27/22  1127      K 4.1      CO2 33*   BUN 23*   CREA 0.72   GLU 93   CA 8.9     Recent Labs     03/27/22  1127   ALT 24   AP 76   TBILI 0.4   TP 8.2   ALB 3.6   GLOB 4.6*     No results for input(s): INR, PTP, APTT, INREXT in the last 72 hours. No results for input(s): FE, TIBC, PSAT, FERR in the last 72 hours. No results found for: FOL, RBCF   No results for input(s): PH, PCO2, PO2 in the last 72 hours.   No results for input(s): CPK, CKNDX, TROIQ in the last 72 hours.     No lab exists for component: CPKMB  No results found for: CHOL, CHOLX, CHLST, CHOLV, HDL, HDLP, LDL, LDLC, DLDLP, TGLX, TRIGL, TRIGP, CHHD, CHHDX  No results found for: [facility]  Lab Results   Component Value Date/Time    Color YELLOW/STRAW 03/28/2022 02:18 AM    Appearance CLEAR 03/28/2022 02:18 AM    Specific gravity 1.022 03/28/2022 02:18 AM    pH (UA) 5.5 03/28/2022 02:18 AM    Protein Negative 03/28/2022 02:18 AM    Glucose Negative 03/28/2022 02:18 AM    Ketone TRACE (A) 03/28/2022 02:18 AM    Bilirubin Negative 03/28/2022 02:18 AM    Urobilinogen 0.2 03/28/2022 02:18 AM    Nitrites Negative 03/28/2022 02:18 AM    Leukocyte Esterase Negative 03/28/2022 02:18 AM    Epithelial cells FEW 03/28/2022 02:18 AM    Bacteria Negative 03/28/2022 02:18 AM    WBC 0-4 03/28/2022 02:18 AM    RBC 0-5 03/28/2022 02:18 AM         Medications Reviewed:     Current Facility-Administered Medications   Medication Dose Route Frequency    sodium chloride (NS) flush 5-40 mL  5-40 mL IntraVENous Q8H    sodium chloride (NS) flush 5-40 mL  5-40 mL IntraVENous PRN    acetaminophen (TYLENOL) tablet 650 mg  650 mg Oral Q6H PRN    Or    acetaminophen (TYLENOL) suppository 650 mg  650 mg Rectal Q6H PRN    polyethylene glycol (MIRALAX) packet 17 g  17 g Oral DAILY PRN    ondansetron (ZOFRAN ODT) tablet 4 mg  4 mg Oral Q8H PRN    Or    ondansetron (ZOFRAN) injection 4 mg  4 mg IntraVENous Q6H PRN    lidocaine 4 % patch 1 Patch  1 Patch TransDERmal Q24H    traMADoL (ULTRAM) tablet 50 mg  50 mg Oral Q6H PRN    acetaminophen (TYLENOL) tablet 650 mg  650 mg Oral Q8H    ascorbic acid (vitamin C) (VITAMIN C) tablet 500 mg  500 mg Oral DAILY    escitalopram oxalate (LEXAPRO) tablet 10 mg  10 mg Oral QPM     ______________________________________________________________________  EXPECTED LENGTH OF STAY: - - -  ACTUAL LENGTH OF STAY:          1                 Alcon Noriega MD

## 2022-03-28 NOTE — PROGRESS NOTES
Problem: Dysphagia (Adult)  Goal: *Acute Goals and Plan of Care (Insert Text)  Description: Speech Therapy Goals  Initiated 3/28/2022  1. Patient will tolerate soft and bite sized diet, thin liquids without overt s/s aspiration within 7 days. Outcome: Not Met     SPEECH LANGUAGE PATHOLOGY BEDSIDE SWALLOW EVALUATION  Patient: Darwin Yeung (43 y.o. female)  Date: 3/28/2022  Primary Diagnosis: Hypoxia [R09.02]        Precautions:        ASSESSMENT :  Based on the objective data described below, the patient presents with mild oral dysphagia characterized by prolonged but complete mastication of solids. Patient tolerated all PO without overt s/s aspiration. Patient reports sensation of PO becoming stuck. Family reports this has occurred in the past but feel it is worse due to reduced mobility currently. Patient demonstrated frequent belching with PO trials. Patient at increased risk for aspiration given recent fall and generalized weakness. Patient at increased risk for post prandial aspiration given suspected esophageal dysphagia with sensation of PO becoming stuck and frequent belching. Given bedside presentation feel patient is safe to continue soft and bite sized diet, thin liquids with swallow precautions listed below. Family reports low volume speech. Suspect this is related to respiratory support for speech and patient with shallow breathing. Patient will benefit from skilled intervention to address the above impairments. Patients rehabilitation potential is considered to be Good     PLAN :  Recommendations and Planned Interventions:  Soft and bite sized  Thin liquids via cup  Sit up for all PO and 30 minutes after  Alter solids and liquids  Small bites and sips  Medication whole with liquids or in puree as tolerated  Frequency/Duration: Patient will be followed by speech-language pathology 2 times a week to address goals. Discharge Recommendations:  To Be Determined     SUBJECTIVE: Patient stated It's feels like it's stuck in my throat patient points to chest.  Family at bedside reports h/o esophageal dilation and hiatal hernia. OBJECTIVE:     Past Medical History:   Diagnosis Date    Arthritis     Coagulation defects 1969    BLEEDS EASILY; TRANSFUSED AFTER HYSTERECTOMY    GERD (gastroesophageal reflux disease)     History and physical examination, occupation     Hypertension     Unspecified adverse effect of anesthesia     AGGITATION, ROOM SPINNING, O/P REQUIRED OVERNIGHT STAY    Unspecified adverse effect of anesthesia 1970s    STOPPED BREATHING DURING BREAST BIOPSY    Vertigo      Past Surgical History:   Procedure Laterality Date    BREAST SURGERY PROCEDURE UNLISTED  X5 R, X1 L  1970s    CYSTS     HX GI  1996    ESOPHAGEAL DILATION    HX HEENT  2006    CATARACTS   R&L    HX HYSTERECTOMY  1969    HX ORTHOPAEDIC  2000    R TOTAL KNEE     Prior Level of Function/Home Situation:   Home Situation  Home Environment: Private residence  # Steps to Enter: 1  Rails to Enter: No  One/Two Story Residence: Two story, live on 1st floor  Living Alone: No  Support Systems: Other Family Member(s)  Patient Expects to be Discharged to[de-identified] Home with home health  Current DME Used/Available at Home: Walker, rollator,Walker, rolling,Other (comment),Cane, straight,Commode, bedside (transport chair for appointments)  Tub or Shower Type:  Other (comment) (Sponge bathes)  Diet prior to admission: Soft, minced  Current Diet:  Soft and bite sized   Cognitive and Communication Status:  Neurologic State: Alert  Orientation Level: Oriented to person,Oriented to place,Oriented to situation  Cognition: Follows commands  Perception: Appears intact  Perseveration: No perseveration noted  Safety/Judgement: Awareness of environment,Fall prevention  Oral Assessment:  Oral Assessment  Labial: No impairment  Dentition: Extractions;Limited;Poor  Oral Hygiene: Moist oral mucosa  Lingual: No impairment  Velum: No impairment  Mandible: No impairment  P.O. Trials:  Patient Position: Upright in bed  Vocal quality prior to P.O.: No impairment  Consistency Presented: Puree; Solid; Thin liquid  How Presented: Self-fed/presented;Cup/sip;Spoon     Bolus Acceptance: No impairment  Bolus Formation/Control: Impaired  Type of Impairment: Delayed  Propulsion: No impairment  Oral Residue: None  Initiation of Swallow: No impairment     Aspiration Signs/Symptoms: None                        NOMS:   The NOMS functional outcome measure was used to quantify this patient's level of swallowing impairment. Based on the NOMS, the patient was determined to be at level 4 for swallow function       NOMS Swallowing Levels:  Level 1 (CN): NPO  Level 2 (CM): NPO but takes consistency in therapy  Level 3 (CL): Takes less than 50% of nutrition p.o. and continues with nonoral feedings; and/or safe with mod cues; and/or max diet restriction  Level 4 (CK): Safe swallow but needs mod cues; and/or mod diet restriction; and/or still requires some nonoral feeding/supplements  Level 5 (CJ): Safe swallow with min diet restriction; and/or needs min cues  Level 6 (CI): Independent with p.o.; rare cues; usually self cues; may need to avoid some foods or needs extra time  Level 7 ([address] [place] [address]): Independent for all p.o.  TIP. (2003). National Outcomes Measurement System (NOMS): Adult Speech-Language Pathology User's Guide. After treatment:   Patient left in no apparent distress in bed, Call bell within reach, Nursing notified, and Caregiver / family present    COMMUNICATION/EDUCATION:   Patient was educated regarding role of SLP and POC. Patient and caregivers indicate understanding. The patient's plan of care including recommendations, planned interventions, and recommended diet changes were discussed with:     Patient/family have participated as able in goal setting and plan of care. Patient/family agree to work toward stated goals and plan of care.     Thank you for this referral.  Alexia Saleh, SLP  Time Calculation: 20 mins

## 2022-03-28 NOTE — PROGRESS NOTES
Orders received, chart reviewed and patient evaluated by occupational therapy. Pending progression with skilled acute occupational therapy, recommend:  Occupational therapy at least 2 days/week in the home AND ensure assist and/or supervision for safety with ADL and all transfers     Recommend with nursing ADLs with supervision/setup, OOB to chair 3x/day and toileting via functional mobility to and from bathroom x1 assist and walker and gait belt. Thank you for completing as able in order to maintain patient strength, endurance and independence. Full evaluation to follow.      Jorge Stephens MS, OTR/L

## 2022-03-28 NOTE — PROGRESS NOTES
Problem: Falls - Risk of  Goal: *Absence of Falls  Description: Document Tiara Flynn Fall Risk and appropriate interventions in the flowsheet. Outcome: Progressing Towards Goal  Note: Fall Risk Interventions:  Mobility Interventions: Communicate number of staff needed for ambulation/transfer,OT consult for ADLs,Patient to call before getting OOB,PT Consult for mobility concerns,PT Consult for assist device competence,Utilize walker, cane, or other assistive device              Elimination Interventions: Call light in reach,Patient to call for help with toileting needs,Toileting schedule/hourly rounds    History of Falls Interventions: Consult care management for discharge planning,Door open when patient unattended,Evaluate medications/consider consulting pharmacy,Investigate reason for fall,Room close to nurse's station         Problem: Patient Education: Go to Patient Education Activity  Goal: Patient/Family Education  Outcome: Progressing Towards Goal     Problem: Pressure Injury - Risk of  Goal: *Prevention of pressure injury  Description: Document Gatito Scale and appropriate interventions in the flowsheet. Outcome: Progressing Towards Goal  Note: Pressure Injury Interventions:  Sensory Interventions: Avoid rigorous massage over bony prominences,Check visual cues for pain,Float heels,Keep linens dry and wrinkle-free,Maintain/enhance activity level,Minimize linen layers,Pressure redistribution bed/mattress (bed type),Turn and reposition approx.  every two hours (pillows and wedges if needed)    Moisture Interventions: Absorbent underpads,Apply protective barrier, creams and emollients,Check for incontinence Q2 hours and as needed,Maintain skin hydration (lotion/cream),Minimize layers,Moisture barrier,Offer toileting Q_hr    Activity Interventions: Increase time out of bed,Pressure redistribution bed/mattress(bed type),PT/OT evaluation    Mobility Interventions: Float heels,HOB 30 degrees or less,Pressure redistribution bed/mattress (bed type),PT/OT evaluation    Nutrition Interventions: Document food/fluid/supplement intake,Discuss nutritional consult with provider,Offer support with meals,snacks and hydration    Friction and Shear Interventions: Apply protective barrier, creams and emollients,HOB 30 degrees or less,Lift sheet,Minimize layers                Problem: Patient Education: Go to Patient Education Activity  Goal: Patient/Family Education  Outcome: Progressing Towards Goal

## 2022-03-28 NOTE — PROGRESS NOTES
Pt is laying in bed with even and unlabored respirations on 1 L of oxygen via nasal cannula. No complaints of pain at this time. No distress noted. Pts oxygen saturation was 88% on room air during the shift and was started on 1 L of oxygen via nasal cannula. Oxygen saturation is 97% now on 1 L of oxygen. Pt was given tramadol one time during the shift for left rib pain. Wound care saw the pt. Physical therapy and occupational therapy saw the pt during the shift. Speech therapy evaluated the pt during the shift. Pt has been encouraged to use her spirometer four times every hour that she is awake. Plan of care is for the pt to go home with home health. All safety precautions are in place. Bed in low position and locked. Call bell within reach. Problem: Falls - Risk of  Goal: *Absence of Falls  Description: Document Norma Curran Fall Risk and appropriate interventions in the flowsheet. Outcome: Progressing Towards Goal  Note: Fall Risk Interventions:  Mobility Interventions: Communicate number of staff needed for ambulation/transfer    Mentation Interventions: Door open when patient unattended,Evaluate medications/consider consulting pharmacy    Medication Interventions: Evaluate medications/consider consulting pharmacy    Elimination Interventions: Call light in reach,Patient to call for help with toileting needs    History of Falls Interventions: Door open when patient unattended,Evaluate medications/consider consulting pharmacy,Investigate reason for fall,Room close to nurse's station         Problem: Patient Education: Go to Patient Education Activity  Goal: Patient/Family Education  Outcome: Progressing Towards Goal     Problem: Pressure Injury - Risk of  Goal: *Prevention of pressure injury  Description: Document Gatito Scale and appropriate interventions in the flowsheet.   Outcome: Progressing Towards Goal  Note: Pressure Injury Interventions:  Sensory Interventions: Assess changes in LOC,Assess need for specialty bed,Avoid rigorous massage over bony prominences    Moisture Interventions: Absorbent underpads,Apply protective barrier, creams and emollients,Assess need for specialty bed,Check for incontinence Q2 hours and as needed    Activity Interventions: Assess need for specialty bed,Chair cushion,Increase time out of bed,Pressure redistribution bed/mattress(bed type)    Mobility Interventions: Assess need for specialty bed,Chair cushion,Float heels,HOB 30 degrees or less    Nutrition Interventions: Document food/fluid/supplement intake,Offer support with meals,snacks and hydration    Friction and Shear Interventions: Apply protective barrier, creams and emollients,Feet elevated on foot rest,Foam dressings/transparent film/skin sealants,HOB 30 degrees or less                Problem: Patient Education: Go to Patient Education Activity  Goal: Patient/Family Education  Outcome: Progressing Towards Goal     Problem: Patient Education: Go to Patient Education Activity  Goal: Patient/Family Education  Outcome: Progressing Towards Goal     Problem: Patient Education: Go to Patient Education Activity  Goal: Patient/Family Education  Outcome: Progressing Towards Goal     Problem: Patient Education: Go to Patient Education Activity  Goal: Patient/Family Education  Outcome: Progressing Towards Goal

## 2022-03-28 NOTE — PROGRESS NOTES
ABENA:  1. RUR-9%  2. Home with family assistance and home health, [facility] accepted information placed on AVS.  3. PT/OT/wound care consults. Care Management Interventions  PCP Verified by CM: Yes (New pcp appointment)  Palliative Care Criteria Met (RRAT>21 & CHF Dx)?: No  Mode of Transport at Discharge: Other (see comment)  Discharge Durable Medical Equipment: No  Health Maintenance Reviewed: Yes  Physical Therapy Consult: Yes  Occupational Therapy Consult: Yes  Speech Therapy Consult: No  Support Systems: Other Family Member(s)  Confirm Follow Up Transport: Family  The Patient and/or Patient Representative was Provided with a Choice of Provider and Agrees with the Discharge Plan?: Yes  [employer] Information Provided?: No  Discharge Location  Patient Expects to be Discharged to[de-identified] Home with home health    Reason for Admission:  Hypoxia                      RUR Score:          9%           Plan for utilizing home health:    Referrals sent to  agencies accepting patient insurance. Family agreed. PCP: First and Last name:  Francisco Goins MD     Name of Practice:  [address] [place] [address]   Are you a current patient: Yes/No: no, patient has new pcp appointment    Approximate date of last visit:    Can you participate in a virtual visit with your PCP: no                    Current Advanced Directive/Advance Care Plan: Full Code      Healthcare Decision Maker:   Click here to complete [address] including selection of the [address] Relationship (ie \"Primary\")                             Transition of Care Plan:                    CM met with patient and her granddaughter, Michael Greer [phone] at bedside to explain role and offer support. The patient son lives with her upstairs in private residence but he is disabled and elderly. She is independent with ADLs, but her granddaughter assist with IADLs(medical appointments, personal errands, groceries/cooking).  She has access to transfer chair for medical appointments, also walker and cane access. CM discussed home health with patient and granddaughter, they are in agreement for referrals. CM will send referrals to agencies that accept her insurance. In the past, the patient used [employer] Medicare pt has received, reviewed, and signed 1st [employer] letter informing them of their right to appeal the discharge. Signed copy has been placed on pt bedside chart. Maria Isabel Mcguire [facility]

## 2022-03-28 NOTE — WOUND CARE
WOCN Note:     New consult placed for assessment of left great toe and sacrum. Chart reviewed. Assessed in room 610. Family at the bedside. Admitted DX:  Hypoxia  Past Medical History:   Diagnosis Date    Arthritis     Coagulation defects 1969    BLEEDS EASILY; TRANSFUSED AFTER HYSTERECTOMY    GERD (gastroesophageal reflux disease)     History and physical examination, occupation     Hypertension     Unspecified adverse effect of anesthesia     AGGITATION, ROOM SPINNING, O/P REQUIRED OVERNIGHT STAY    Unspecified adverse effect of anesthesia 1970s    STOPPED BREATHING DURING BREAST BIOPSY    Vertigo      Assessment:   Patient is Alert, [place], continent and requires assist of 2 with repositioning. Bed: ScreenMedix gel    Patient reports no pain. Patient repositioned on right side with pillow. Heels intact without erythema and elevated on pillow. 1.  Sacrum and buttocks intact without erythema. 2.  POA Left great toe nail, cushioned with dressing. Granddaughter states that the nail will cut into 2nd toe if not cushioned. Recommend podiatry visit after discharge to manage toe nails. Wound, Pressure Prevention & Skin Care Recommendations:    1. Minimize layers of linen/pads under patient to optimize support surface. 2.  Turn/reposition approximately every 2 hours and offload heels. 3.  Manage moisture/ Keep skin folds clean and dry/minimize brief usage. 4.  Sacrum:  Protect with sacral foam dressing. Discussed above plan with family and Harmeet Green RN.     Transition of Care:   Plan to follow as needed while admitted to hospital.    Alverto Max, RENUN RN [address] Inpatient Wound Care  Available on [employer]  Office [phone]

## 2022-03-29 VITALS
DIASTOLIC BLOOD PRESSURE: 66 MMHG | RESPIRATION RATE: 16 BRPM | SYSTOLIC BLOOD PRESSURE: 121 MMHG | HEART RATE: 70 BPM | OXYGEN SATURATION: 94 % | TEMPERATURE: 97.8 F | WEIGHT: 107.14 LBS

## 2022-03-29 LAB
ALBUMIN SERPL-MCNC: 2.7 G/DL (ref 3.5–5)
ALBUMIN/GLOB SERPL: 0.8 {RATIO} (ref 1.1–2.2)
ALP SERPL-CCNC: 54 U/L (ref 45–117)
ALT SERPL-CCNC: 16 U/L (ref 12–78)
ANION GAP SERPL CALC-SCNC: 1 MMOL/L (ref 5–15)
AST SERPL-CCNC: 25 U/L (ref 15–37)
BILIRUB SERPL-MCNC: 0.3 MG/DL (ref 0.2–1)
BUN SERPL-MCNC: 28 MG/DL (ref 6–20)
BUN/CREAT SERPL: 51 (ref 12–20)
CALCIUM SERPL-MCNC: 8.2 MG/DL (ref 8.5–10.1)
CHLORIDE SERPL-SCNC: 103 MMOL/L (ref 97–108)
CO2 SERPL-SCNC: 33 MMOL/L (ref 21–32)
CREAT SERPL-MCNC: 0.55 MG/DL (ref 0.55–1.02)
ERYTHROCYTE [DISTWIDTH] IN BLOOD BY AUTOMATED COUNT: 13.5 % (ref 11.5–14.5)
GLOBULIN SER CALC-MCNC: 3.2 G/DL (ref 2–4)
GLUCOSE SERPL-MCNC: 102 MG/DL (ref 65–100)
HCT VFR BLD AUTO: 27 % (ref 35–47)
HGB BLD-MCNC: 8.7 G/DL (ref 11.5–16)
MCH RBC QN AUTO: 30.7 PG (ref 26–34)
MCHC RBC AUTO-ENTMCNC: 32.2 G/DL (ref 30–36.5)
MCV RBC AUTO: 95.4 FL (ref 80–99)
NRBC # BLD: 0 K/UL (ref 0–0.01)
NRBC BLD-RTO: 0 PER 100 WBC
PLATELET # BLD AUTO: 141 K/UL (ref 150–400)
PMV BLD AUTO: 10.6 FL (ref 8.9–12.9)
POTASSIUM SERPL-SCNC: 4.2 MMOL/L (ref 3.5–5.1)
PROT SERPL-MCNC: 5.9 G/DL (ref 6.4–8.2)
RBC # BLD AUTO: 2.83 M/UL (ref 3.8–5.2)
SODIUM SERPL-SCNC: 137 MMOL/L (ref 136–145)
WBC # BLD AUTO: 6 K/UL (ref 3.6–11)

## 2022-03-29 PROCEDURE — 85027 COMPLETE CBC AUTOMATED: CPT

## 2022-03-29 PROCEDURE — 74011000250 HC RX REV CODE- 250: Performed by: HOSPITALIST

## 2022-03-29 PROCEDURE — 80053 COMPREHEN METABOLIC PANEL: CPT

## 2022-03-29 PROCEDURE — 36415 COLL VENOUS BLD VENIPUNCTURE: CPT

## 2022-03-29 PROCEDURE — G0378 HOSPITAL OBSERVATION PER HR: HCPCS

## 2022-03-29 PROCEDURE — 74011250637 HC RX REV CODE- 250/637: Performed by: HOSPITALIST

## 2022-03-29 PROCEDURE — 97535 SELF CARE MNGMENT TRAINING: CPT

## 2022-03-29 RX ORDER — TRAMADOL HYDROCHLORIDE 50 MG/1
50 TABLET ORAL
Qty: 8 TABLET | Refills: 0 | Status: SHIPPED | OUTPATIENT
Start: 2022-03-29 | End: 2022-04-01

## 2022-03-29 RX ORDER — TRAMADOL HYDROCHLORIDE 50 MG/1
50 TABLET ORAL
Qty: 8 TABLET | Refills: 0 | Status: SHIPPED | OUTPATIENT
Start: 2022-03-29 | End: 2022-03-29 | Stop reason: SDUPTHER

## 2022-03-29 RX ORDER — ESCITALOPRAM OXALATE 10 MG/1
10 TABLET ORAL EVERY EVENING
Qty: 30 TABLET | Refills: 0 | Status: SHIPPED | OUTPATIENT
Start: 2022-03-29

## 2022-03-29 RX ORDER — LIDOCAINE 4 G/100G
1 PATCH TOPICAL EVERY 24 HOURS
Qty: 30 EACH | Refills: 0 | Status: SHIPPED | OUTPATIENT
Start: 2022-03-29 | End: 2022-04-28

## 2022-03-29 RX ORDER — LIDOCAINE 4 G/100G
1 PATCH TOPICAL EVERY 24 HOURS
Qty: 30 EACH | Refills: 0 | Status: SHIPPED | OUTPATIENT
Start: 2022-03-29 | End: 2022-03-29 | Stop reason: SDUPTHER

## 2022-03-29 RX ADMIN — ACETAMINOPHEN 650 MG: 325 TABLET ORAL at 06:48

## 2022-03-29 RX ADMIN — TRAMADOL HYDROCHLORIDE 50 MG: 50 TABLET ORAL at 04:27

## 2022-03-29 RX ADMIN — OXYCODONE HYDROCHLORIDE AND ACETAMINOPHEN 500 MG: 500 TABLET ORAL at 09:23

## 2022-03-29 RX ADMIN — SODIUM CHLORIDE, PRESERVATIVE FREE 10 ML: 5 INJECTION INTRAVENOUS at 06:48

## 2022-03-29 NOTE — DISCHARGE SUMMARY
Discharge Summary       PATIENT ID: Nivia Clifton  MRN: [id number]   YOB: 1926    DATE OF ADMISSION: 3/27/2022 11:11 AM    DATE OF DISCHARGE: 3/29/2022   PRIMARY CARE PROVIDER: Juan C Briceno MD     ATTENDING PHYSICIAN: Theodore Gallo MD   DISCHARGING PROVIDER: Berny Siddiqui MD    To contact this individual call [phone] and ask the  to page. If unavailable ask to be transferred the Adult Hospitalist Department. CONSULTATIONS: None    PROCEDURES/SURGERIES: * No surgery found *      ADMISSION SUMMARY AND HOSPITAL COURSE:      This is a 80-year-old female who states that this morning she accidentally tripped and fell and hypoxic.  She struck the top of her head but does not think she was knocked out. Tianna Peguero is complaining of a large hematoma on the scalp.  She is also complaining of pain over the left lower ribs and thinks she may have broken a rib.  She does not remember actually striking her chest.  She denies any back pain.  There is no shortness of breath.  She denies any nausea or vomiting.  She has no other acute complaint or injury secondary to the fall including pain in her hips or back or extremities.  In the ED, noticed  hypoxia around 88 to 89% on room air. Per daughter, patient lives at home by herself    Hypoxia due to atelectasis due to ribs fracture   -chest x ray left seventh and eighth rib acute fractures. No pneumothorax. Left pneumonia  versus atelectasis versus effusion. Recommend followup PA and lateral chest views in 8-10 weeks to ensure resolution.  -resolved, SpO2 92-95% on RA  -on prn duo neb  -procalcitonin <0.05, no need abx at this time  -monitor pulse ox   Acute left 7th and 8th ribs fracture secondary to fall   -continue prn pain meds and incentive spirometry   Large scalp hematoma due to fall  -CT head left vertex scalp hematoma.  No fracture or acute intracranial abnormality.  -Fall precaution  -resolved  -continue PT/OT  Dysphagia  -seen by speech therapy  -Dysphagia diet soft and easy to bite diet         Code status: Full Code  Prophylaxis: SCD        DISCHARGE DIAGNOSES / PLAN:      Hypoxia due to atelectasis due to ribs fracture   -resolved  -SpO2 92-95% on RA   Acute left 7th and 8th ribs fracture secondary to fall   -continue prn pain meds and incentive spirometry   Large scalp hematoma due to fall  -resolved  Dysphagia  -Dysphagia diet soft and easy to bite diet         NOTIFY YOUR PHYSICIAN FOR ANY OF THE FOLLOWING:   Fever over 101 degrees for 24 hours. Chest pain, shortness of breath, fever, chills, nausea, vomiting, diarrhea, change in mentation, falling, weakness, bleeding. Severe pain or pain not relieved by medications, as well as any other signs or symptoms that you may have questions about. FOLLOW UP APPOINTMENTS:    Follow-up Information     Follow up With Specialties Details Why [place] [facility]   this is your home health agency, they will contact you within 24 hours from your discharge date. [facility] 09, [address] Sonal Gonzalez MD Internal Medicine In one week  [address] [place] [address],[address]  [phone]        DIET: Dysphagia, soft and easy to bite deit    ACTIVITY: Activity as tolerated    EQUIPMENT needed: None    DISCHARGE MEDICATIONS:  Current Discharge Medication List      START taking these medications    Details   lidocaine 4 % patch 1 Patch by TransDERmal route every twenty-four (24) hours for 30 days. Qty: 30 Each, Refills: 0  Start date: 3/29/2022, End date: 4/28/2022      traMADoL (ULTRAM) 50 mg tablet Take 1 Tablet by mouth every six (6) hours as needed for Pain for up to 3 days. Max Daily Amount: 200 mg.   Qty: 8 Tablet, Refills: 0  Start date: 3/29/2022, End date: 4/1/2022    Associated Diagnoses: Closed fracture of one rib of left side, initial encounter      ketorolac (TORADOL) 10 mg tablet Take 1 Tablet by mouth every six (6) hours as needed for Pain. Qty: 16 Tablet, Refills: 0  Start date: 3/27/2022         CONTINUE these medications which have NOT CHANGED    Details   hydrocodone-acetaminophen (LORTAB) 5-500 mg per tablet Take 1 Tab by mouth every four (4) hours as needed for Pain. Qty: 30 Tab, Refills: 0      triamterene-hydrochlorothiazide (DYAZIDE) 37.5-25 mg per capsule Take  by mouth daily. aspirin 81 mg tablet Take 81 mg by mouth daily. cholecalciferol, vitamin d3, (VITAMIN D) 1,000 unit tablet Take  by mouth daily. ascorbic acid (VITAMIN C) 500 mg tablet Take  by mouth daily. meclizine (ANTIVERT) 25 mg tablet Take  by mouth as needed. USUALLY TAKES ONLY AT NIGHT BECAUSE IT MAKES HER GROGGY. amoxicillin (AMOXIL) 500 mg capsule Take 500 mg by mouth. PRIOR TO DENTAL WORK TAKES 4 CAPS              DISPOSITION:    Home With:   OT  PT  HH  RN       Long term SNF/Inpatient Rehab   x Independent/assisted living    Hospice    Other:       PATIENT CONDITION AT DISCHARGE:     Functional status    Poor     Deconditioned    x Independent      Cognition   x  Lucid     Forgetful     Dementia      Catheters/lines (plus indication)    Dillon     PICC     PEG    x None      Code status    x Full code     DNR      PHYSICAL EXAMINATION AT DISCHARGE:  Patient Vitals for the past 24 hrs:   Temp Pulse Resp BP SpO2   03/29/22 0819 97.8 °F (36.6 °C) 70 16 121/66 94 %   03/29/22 0144 98.4 °F (36.9 °C) 87 16 (!) 157/73 96 %   03/28/22 2327    (!) 140/72    03/28/22 2101    (!) 196/83    03/28/22 2004 97.9 °F (36.6 °C) 78 18 (!) 198/98 94 %     General:          Alert, cooperative, no distress, appears stated age. HEENT:           Atraumatic, anicteric sclerae, pink conjunctivae                          No oral ulcers, mucosa moist, throat clear, dentition fair  Neck:               Supple, symmetrical  Lungs:             Clear to auscultation bilaterally.   No Wheezing or Rhonchi. No rales. Chest wall:      No tenderness  No Accessory muscle use. Heart:              Regular  rhythm,  No  murmur   No edema  Abdomen:        Soft, non-tender. Not distended. Bowel sounds normal  Extremities:     No cyanosis. No clubbing,                            Skin turgor normal, Capillary refill normal  Skin:                Not pale. Not Jaundiced  No rashes   Psych:             Not anxious or agitated. Neurologic:      Alert, moves all extremities, answers questions appropriately and responds to commands       Recent Results (from the past 24 hour(s))   CBC W/O DIFF    Collection Time: 03/29/22  1:33 AM   Result Value Ref Range    WBC 6.0 3.6 - 11.0 K/uL    RBC 2.83 (L) 3.80 - 5.20 M/uL    HGB 8.7 (L) 11.5 - 16.0 g/dL    HCT 27.0 (L) 35.0 - 47.0 %    MCV 95.4 80.0 - 99.0 FL    MCH 30.7 26.0 - 34.0 PG    MCHC 32.2 30.0 - 36.5 g/dL    RDW 13.5 11.5 - 14.5 %    PLATELET 410 (L) 643 - 400 K/uL    MPV 10.6 8.9 - 12.9 FL    NRBC 0.0 0  WBC    ABSOLUTE NRBC 0.00 0.00 - 7.06 K/uL   METABOLIC PANEL, COMPREHENSIVE    Collection Time: 03/29/22  1:33 AM   Result Value Ref Range    Sodium 137 136 - 145 mmol/L    Potassium 4.2 3.5 - 5.1 mmol/L    Chloride 103 97 - 108 mmol/L    CO2 33 (H) 21 - 32 mmol/L    Anion gap 1 (L) 5 - 15 mmol/L    Glucose 102 (H) 65 - 100 mg/dL    BUN 28 (H) 6 - 20 MG/DL    Creatinine 0.55 0.55 - 1.02 MG/DL    BUN/Creatinine ratio 51 (H) 12 - 20      GFR est AA >60 >60 ml/min/1.73m2    GFR est non-AA >60 >60 ml/min/1.73m2    Calcium 8.2 (L) 8.5 - 10.1 MG/DL    Bilirubin, total 0.3 0.2 - 1.0 MG/DL    ALT (SGPT) 16 12 - 78 U/L    AST (SGOT) 25 15 - 37 U/L    Alk.  phosphatase 54 45 - 117 U/L    Protein, total 5.9 (L) 6.4 - 8.2 g/dL    Albumin 2.7 (L) 3.5 - 5.0 g/dL    Globulin 3.2 2.0 - 4.0 g/dL    A-G Ratio 0.8 (L) 1.1 - 2.2       CHRONIC MEDICAL DIAGNOSES:  Problem List as of 3/29/2022 Never Reviewed          Codes Class Noted - Resolved    Left rib fracture ICD-10-CM: S22. 32XA  ICD-9-CM: 807.00  3/28/2022 - Present        Hypoxia ICD-10-CM: R09.02  ICD-9-CM: 799.02  3/27/2022 - Present              Greater than 45 minutes were spent with the patient on counseling and coordination of care    Signed:   Celina Neumann MD  3/29/2022  8:48 AM

## 2022-03-29 NOTE — PROGRESS NOTES
Pt is sitting in the chair with even and unlabored respirations on room air. No complaints of pain at this time. No distress noted. AVS was explained to pts granddaughter. Avs was given to pts granddaughter. All questions were answered. IV was removed with little to no bleeding noted. All belongings have been collected and returned to the pt. All safety precautions are in place. Granddaughter is driving the pt home. Problem: Falls - Risk of  Goal: *Absence of Falls  Description: Document Vernia Colder Fall Risk and appropriate interventions in the flowsheet. Outcome: Resolved/Met  Note: Fall Risk Interventions:  Mobility Interventions: Communicate number of staff needed for ambulation/transfer    Mentation Interventions: Evaluate medications/consider consulting pharmacy,Door open when patient unattended    Medication Interventions: Evaluate medications/consider consulting pharmacy    Elimination Interventions: Call light in reach,Patient to call for help with toileting needs    History of Falls Interventions: Door open when patient unattended,Evaluate medications/consider consulting pharmacy,Investigate reason for fall         Problem: Patient Education: Go to Patient Education Activity  Goal: Patient/Family Education  Outcome: Resolved/Met     Problem: Pressure Injury - Risk of  Goal: *Prevention of pressure injury  Description: Document G[place] Scale and appropriate interventions in the flowsheet.   Outcome: Resolved/Met  Note: Pressure Injury Interventions:  Sensory Interventions: Assess changes in LOC,Assess need for specialty bed,Avoid rigorous massage over bony prominences    Moisture Interventions: Absorbent underpads,Apply protective barrier, creams and emollients,Assess need for specialty bed,Check for incontinence Q2 hours and as needed    Activity Interventions: Assess need for specialty bed,Increase time out of bed,Chair cushion    Mobility Interventions: Assess need for specialty bed,Chair cushion,Float heels,HOB 30 degrees or less    Nutrition Interventions: Document food/fluid/supplement intake,Offer support with meals,snacks and hydration    Friction and Shear Interventions: Apply protective barrier, creams and emollients,Feet elevated on foot rest,Foam dressings/transparent film/skin sealants,HOB 30 degrees or less                Problem: Patient Education: Go to Patient Education Activity  Goal: Patient/Family Education  Outcome: Resolved/Met     Problem: Patient Education: Go to Patient Education Activity  Goal: Patient/Family Education  Outcome: Resolved/Met     Problem: Patient Education: Go to Patient Education Activity  Goal: Patient/Family Education  Outcome: Resolved/Met     Problem: Patient Education: Go to Patient Education Activity  Goal: Patient/Family Education  Outcome: Resolved/Met

## 2022-03-29 NOTE — PROGRESS NOTES
Problem: Self Care Deficits Care Plan (Adult)  Goal: *Acute Goals and Plan of Care (Insert Text)  Description:   FUNCTIONAL STATUS PRIOR TO ADMISSION: Patient was modified independent using a single point cane for functional mobility. Uses transport chair outside of home; sponge bathes; typically up moving about home independently or with a cane; does own ADLs including dressing and sponge baths. Children/grandchildren assist with IADL    HOME SUPPORT: The patient lived with son and required modified independence for mobility. Daughter provided food that patient would defrost and heat; son not always present. Occupational Therapy Goals  Initiated 3/28/2022  1. Patient will perform grooming with supervision/set-up within 7 day(s). 2.  Patient will perform upper body dressing with supervision/set-up within 7 day(s). 3.  Patient will perform lower body dressing with supervision/set-up within 7 day(s). 4.  Patient will perform all aspects of toileting with supervision/set-up within 7 day(s). 5.  Patient will utilize energy conservation techniques during functional activities with verbal cues within 7 day(s). Outcome: Progressing Towards Goal   OCCUPATIONAL THERAPY TREATMENT  Patient: Kaley Carmona (43 y.o. female)  Date: 3/29/2022  Diagnosis: Hypoxia [R09.02]  Left rib fracture [S22.32XA] <principal problem not specified>       Precautions:    Chart, occupational therapy assessment, plan of care, and goals were reviewed. ASSESSMENT  Patient continues with skilled OT services and is progressing towards goals. Patient received reclined in bed, amenable to session. Reviewed modified techniques for ADL and bed mobility, patient and granddaughter verbalized understanding and demo'd good carryover. Patient able to don pants and bridge hips at bedlevel to complete LE dressing. Transferred to EOB with Min A and verbal cues for technique.  Discussed at length patient's home exercise program and other modifications to make to assist with healing and comfort. Required less assist to complete transfer to standing and transferred to bedside chair. Provided ice pack for L flank and pillows for UE elevation. Patient left with all needs in reach, NAD. Current Level of Function Impacting Discharge (ADLs): up to min a ADL/mobility    Other factors to consider for discharge: below baseline, limited by pain         PLAN :  Patient continues to benefit from skilled intervention to address the above impairments. Continue treatment per established plan of care to address goals. Recommend with staff: OOB 3x daily for meals, functional mobility to bathroom    Recommend next OT session: OOB ADL    Recommendation for discharge: (in order for the patient to meet his/her long term goals)  Occupational therapy at least 2 days/week in the home AND ensure assist and/or supervision for safety with ADL/transfers    This discharge recommendation:  Has been made in collaboration with the attending provider and/or case management    IF patient discharges home will need the following DME: patient owns DME required for discharge       SUBJECTIVE:   Patient stated I can't sit at home and watch TV all day.     OBJECTIVE DATA SUMMARY:   Cognitive/Behavioral Status:  Neurologic State: Alert  Orientation Level: Oriented to person;Oriented to situation;Oriented to place;Oriented to time  Cognition: Follows commands; Appropriate safety awareness  Perception: Appears intact  Perseveration: No perseveration noted  Safety/Judgement: Awareness of environment; Insight into deficits    Functional Mobility and Transfers for ADLs:  Bed Mobility:  Rolling: Minimum assistance  Supine to Sit: Minimum assistance    Transfers:  Sit to Stand: Minimum assistance     Bed to Chair: Minimum assistance    Balance:  Sitting: Intact  Sitting - Static: Good (unsupported)  Sitting - Dynamic: Good (unsupported)  Standing: Impaired  Standing - Static: Constant support;Good  Standing - Dynamic : Constant support; Fair    ADL Intervention:                           Lower Body Dressing Assistance  Pants With Elastic Waist: Contact guard assistance  Socks: Moderate assistance  Position Performed: Supine         Cognitive Retraining  Safety/Judgement: Awareness of environment; Insight into deficits    Pain:  Did not quantify, pain with deep breaths and movement    Activity Tolerance:   Fair and requires rest breaks    After treatment patient left in no apparent distress:   Sitting in chair, Call bell within reach, and Caregiver / family present    COMMUNICATION/COLLABORATION:   The patients plan of care was discussed with: Physical therapist and Registered nurse.      Esau Foreman OT  Time Calculation: 39 mins

## 2022-03-29 NOTE — PROGRESS NOTES
[facility] Adult  Hospitalist Group                                                                                          Hospitalist Progress Note  Alcon Noriega MD  Answering service: [phone] from in house phone        Date of Service:  3/29/2022  NAME:  Miko Vallejo  :  10/8/1926  MRN:  [id number]      Admission Summary: This is a 51-year-old female who states that this morning she accidentally tripped and fell and hypoxic.  She struck the top of her head but does not think she was knocked out. Ethel White is complaining of a large hematoma on the scalp.  She is also complaining of pain over the left lower ribs and thinks she may have broken a rib.  She does not remember actually striking her chest.  She denies any back pain. Zechariah Anglican is no shortness of breath.  She denies any nausea or vomiting.  She has no other acute complaint or injury secondary to the fall including pain in her hips or back or extremities. In the ED, noticed  hypoxia around 88 to 89% on room air. Per daughter, patient lives at home by herself     Interval history / Subjective:     She said she feels better, no chest pain or shortness of breath     Assessment & Plan:     Hypoxia possible due to atelectasis due to ribs fracture   -chest x ray left seventh and eighth rib acute fractures. No pneumothorax. Left pneumonia  versus atelectasis versus effusion. Recommend followup PA and lateral chest views in 8-10 weeks to ensure resolution.  -SpO2 92-95% on RA  -prn duo neb  -procalcitonin <0.05, no need abx at this time  -monitor pulse ox    Acute left 7th and 8th ribs fracture secondary to fall   -continue prn pain meds and incentive spirometry     Large scalp hematoma due to fall  -CT head left vertex scalp hematoma.  No fracture or acute intracranial abnormality.  -Fall precaution  -improved  -PT/OT    Dysphagia  -seen by speech therapy  -Dysphagia diet soft and easy to bite diet        Code status: Full Code  Prophylaxis: SCD  Care Plan discussed with: Patient, Nurse and CM  Anticipated Disposition: Home with home health      Hospital Problems  Never Reviewed          Codes Class Noted POA    Left rib fracture ICD-10-CM: V63.24XU  ICD-9-CM: 807.00  3/28/2022 Unknown        Hypoxia ICD-10-CM: R09.02  ICD-9-CM: 799.02  3/27/2022 Unknown                Vital Signs:    Last 24hrs VS reviewed since prior progress note. Most recent are:  Visit Vitals  /66 (BP 1 Location: Right arm, BP Patient Position: At rest)   Pulse 70   Temp 97.8 °F (36.6 °C)   Resp 16   Wt 48.6 kg (107 lb 2.3 oz)   SpO2 94%       No intake or output data in the 24 hours ending 03/29/22 0830     Physical Examination:     I had a face to face encounter with this patient and independently examined them on 3/29/2022 as outlined below:          Constitutional:  No acute distress, cooperative, pleasant    ENT:  Oral mucosa moist, oropharynx benign. Resp:  Decrease bronchial breath sound bilaterally. No wheezing/rhonchi/rales. No accessory muscle use. CV:  Regular rhythm, normal rate, no murmurs, gallops, rubs    GI:  Soft, non distended, non tender. normoactive bowel sounds, no hepatosplenomegaly     Musculoskeletal:  No edema     Neurologic:  Moves all extremities.   AAOx3, CN II-XII reviewed            Data Review:    Review and/or order of clinical lab test  Review and/or order of tests in the radiology section of CPT  Review and/or order of tests in the medicine section of CPT      Labs:     Recent Labs     03/29/22  0133 03/27/22  1127   WBC 6.0 3.9   HGB 8.7* 13.3   HCT 27.0* 41.2   * 174     Recent Labs     03/29/22  0133 03/27/22  1127    136   K 4.2 4.1    100   CO2 33* 33*   BUN 28* 23*   CREA 0.55 0.72   * 93   CA 8.2* 8.9     Recent Labs     03/29/22  0133 03/27/22  1127   ALT 16 24   AP 54 76   TBILI 0.3 0.4   TP 5.9* 8.2   ALB 2.7* 3.6   GLOB 3.2 4.6*     No results for input(s): INR, PTP, APTT, INREXT, INREXT in the last 72 hours. No results for input(s): FE, TIBC, PSAT, FERR in the last 72 hours. No results found for: FOL, RBCF   No results for input(s): PH, PCO2, PO2 in the last 72 hours. No results for input(s): CPK, CKNDX, TROIQ in the last 72 hours.     No lab exists for component: CPKMB  No results found for: CHOL, CHOLX, CHLST, CHOLV, HDL, HDLP, LDL, LDLC, DLDLP, TGLX, TRIGL, TRIGP, CHHD, CHHDX  No results found for: [facility]  Lab Results   Component Value Date/Time    Color YELLOW/STRAW 03/28/2022 02:18 AM    Appearance CLEAR 03/28/2022 02:18 AM    Specific gravity 1.022 03/28/2022 02:18 AM    pH (UA) 5.5 03/28/2022 02:18 AM    Protein Negative 03/28/2022 02:18 AM    Glucose Negative 03/28/2022 02:18 AM    Ketone TRACE (A) 03/28/2022 02:18 AM    Bilirubin Negative 03/28/2022 02:18 AM    Urobilinogen 0.2 03/28/2022 02:18 AM    Nitrites Negative 03/28/2022 02:18 AM    Leukocyte Esterase Negative 03/28/2022 02:18 AM    Epithelial cells FEW 03/28/2022 02:18 AM    Bacteria Negative 03/28/2022 02:18 AM    WBC 0-4 03/28/2022 02:18 AM    RBC 0-5 03/28/2022 02:18 AM         Medications Reviewed:     Current Facility-Administered Medications   Medication Dose Route Frequency    sodium chloride (NS) flush 5-40 mL  5-40 mL IntraVENous Q8H    sodium chloride (NS) flush 5-40 mL  5-40 mL IntraVENous PRN    acetaminophen (TYLENOL) tablet 650 mg  650 mg Oral Q6H PRN    Or    acetaminophen (TYLENOL) suppository 650 mg  650 mg Rectal Q6H PRN    polyethylene glycol (MIRALAX) packet 17 g  17 g Oral DAILY PRN    ondansetron (ZOFRAN ODT) tablet 4 mg  4 mg Oral Q8H PRN    Or    ondansetron (ZOFRAN) injection 4 mg  4 mg IntraVENous Q6H PRN    lidocaine 4 % patch 1 Patch  1 Patch TransDERmal Q24H    traMADoL (ULTRAM) tablet 50 mg  50 mg Oral Q6H PRN    acetaminophen (TYLENOL) tablet 650 mg  650 mg Oral Q8H    ascorbic acid (vitamin C) (VITAMIN C) tablet 500 mg  500 mg Oral DAILY    escitalopram oxalate (LEXAPRO) tablet 10 mg  10 mg Oral QPM     ______________________________________________________________________  EXPECTED LENGTH OF STAY: 2d 16h  ACTUAL LENGTH OF STAY:          1                 Daniel Smith MD

## 2022-03-29 NOTE — PROGRESS NOTES
Patient will discharge home with family and [facility] home health services.     [facility]

## 2022-07-18 ENCOUNTER — TRANSCRIBE ORDER (OUTPATIENT)
Dept: GENERAL RADIOLOGY | Age: 87
End: 2022-07-18

## 2022-07-18 ENCOUNTER — HOSPITAL ENCOUNTER (OUTPATIENT)
Dept: GENERAL RADIOLOGY | Age: 87
Discharge: HOME | End: 2022-07-18
Attending: INTERNAL MEDICINE
Payer: MEDICARE

## 2022-07-18 DIAGNOSIS — M25.522 LEFT ELBOW PAIN: Primary | ICD-10-CM

## 2022-07-18 DIAGNOSIS — M25.522 LEFT ELBOW PAIN: ICD-10-CM

## 2022-07-18 PROCEDURE — 73080 X-RAY EXAM OF ELBOW: CPT

## 2025-07-06 ENCOUNTER — APPOINTMENT (OUTPATIENT)
Facility: HOSPITAL | Age: 89
End: 2025-07-06
Payer: MEDICARE

## 2025-07-06 ENCOUNTER — HOSPITAL ENCOUNTER (EMERGENCY)
Facility: HOSPITAL | Age: 89
Discharge: ANOTHER HOSPITAL | End: 2025-07-06
Attending: EMERGENCY MEDICINE
Payer: MEDICARE

## 2025-07-06 VITALS
DIASTOLIC BLOOD PRESSURE: 62 MMHG | RESPIRATION RATE: 19 BRPM | HEIGHT: 63 IN | HEART RATE: 76 BPM | BODY MASS INDEX: 18.98 KG/M2 | TEMPERATURE: 98.2 F | OXYGEN SATURATION: 100 % | SYSTOLIC BLOOD PRESSURE: 199 MMHG

## 2025-07-06 DIAGNOSIS — S32.592A PUBIC RAMUS FRACTURE, LEFT, CLOSED, INITIAL ENCOUNTER (HCC): ICD-10-CM

## 2025-07-06 DIAGNOSIS — S32.415A CLOSED NONDISPLACED FRACTURE OF ANTERIOR WALL OF LEFT ACETABULUM, INITIAL ENCOUNTER (HCC): ICD-10-CM
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
## 2025-07-06 DIAGNOSIS — R79.89 TROPONIN LEVEL ELEVATED: Primary | ICD-10-CM

## 2025-07-06 LAB
ALBUMIN SERPL-MCNC: 3.4 G/DL (ref 3.5–5)
ALBUMIN/GLOB SERPL: 0.9 (ref 1.1–2.2)
ALP SERPL-CCNC: 78 U/L (ref 45–117)
ALT SERPL-CCNC: 20 U/L (ref 12–78)
ANION GAP SERPL CALC-SCNC: 3 MMOL/L (ref 2–12)
AST SERPL-CCNC: 46 U/L (ref 15–37)
BASOPHILS # BLD: 0.02 K/UL (ref 0–0.1)
BASOPHILS NFR BLD: 0.2 % (ref 0–1)
BILIRUB SERPL-MCNC: 0.5 MG/DL (ref 0.2–1)
BUN SERPL-MCNC: 32 MG/DL (ref 6–20)
BUN/CREAT SERPL: 36 (ref 12–20)
CALCIUM SERPL-MCNC: 9 MG/DL (ref 8.5–10.1)
CHLORIDE SERPL-SCNC: 101 MMOL/L (ref 97–108)
CO2 SERPL-SCNC: 32 MMOL/L (ref 21–32)
COMMENT:: NORMAL
CREAT SERPL-MCNC: 0.89 MG/DL (ref 0.55–1.02)
DIFFERENTIAL METHOD BLD: ABNORMAL
EOSINOPHIL # BLD: 0.06 K/UL (ref 0–0.4)
EOSINOPHIL NFR BLD: 0.7 % (ref 0–7)
ERYTHROCYTE [DISTWIDTH] IN BLOOD BY AUTOMATED COUNT: 15 % (ref 11.5–14.5)
GLOBULIN SER CALC-MCNC: 3.9 G/DL (ref 2–4)
GLUCOSE SERPL-MCNC: 98 MG/DL (ref 65–100)
HCT VFR BLD AUTO: 37.5 % (ref 35–47)
HGB BLD-MCNC: 11.4 G/DL (ref 11.5–16)
IMM GRANULOCYTES # BLD AUTO: 0.05 K/UL (ref 0–0.04)
IMM GRANULOCYTES NFR BLD AUTO: 0.6 % (ref 0–0.5)
LYMPHOCYTES # BLD: 0.95 K/UL (ref 0.8–3.5)
LYMPHOCYTES NFR BLD: 11.8 % (ref 12–49)
MCH RBC QN AUTO: 28.1 PG (ref 26–34)
MCHC RBC AUTO-ENTMCNC: 30.4 G/DL (ref 30–36.5)
MCV RBC AUTO: 92.4 FL (ref 80–99)
MONOCYTES # BLD: 0.49 K/UL (ref 0–1)
MONOCYTES NFR BLD: 6.1 % (ref 5–13)
NEUTS SEG # BLD: 6.48 K/UL (ref 1.8–8)
NEUTS SEG NFR BLD: 80.6 % (ref 32–75)
NRBC # BLD: 0 K/UL (ref 0–0.01)
NRBC BLD-RTO: 0 PER 100 WBC
NT PRO BNP: 2527 PG/ML
PLATELET # BLD AUTO: 221 K/UL (ref 150–400)
PMV BLD AUTO: 10.2 FL (ref 8.9–12.9)
POTASSIUM SERPL-SCNC: 3.9 MMOL/L (ref 3.5–5.1)
PROT SERPL-MCNC: 7.3 G/DL (ref 6.4–8.2)
RBC # BLD AUTO: 4.06 M/UL (ref 3.8–5.2)
SODIUM SERPL-SCNC: 136 MMOL/L (ref 136–145)
SPECIMEN HOLD: NORMAL
TROPONIN I SERPL HS-MCNC: 733 NG/L (ref 0–51)
WBC # BLD AUTO: 8.1 K/UL (ref 3.6–11)

## 2025-07-06 PROCEDURE — 85025 COMPLETE CBC W/AUTO DIFF WBC: CPT

## 2025-07-06 PROCEDURE — 83880 ASSAY OF NATRIURETIC PEPTIDE: CPT

## 2025-07-06 PROCEDURE — 93005 ELECTROCARDIOGRAM TRACING: CPT | Performed by: EMERGENCY MEDICINE

## 2025-07-06 PROCEDURE — 71045 X-RAY EXAM CHEST 1 VIEW: CPT

## 2025-07-06 PROCEDURE — 72192 CT PELVIS W/O DYE: CPT

## 2025-07-06 PROCEDURE — 6370000000 HC RX 637 (ALT 250 FOR IP): Performed by: EMERGENCY MEDICINE

## 2025-07-06 PROCEDURE — 99285 EMERGENCY DEPT VISIT HI MDM: CPT

## 2025-07-06 PROCEDURE — 80053 COMPREHEN METABOLIC PANEL: CPT

## 2025-07-06 PROCEDURE — 72125 CT NECK SPINE W/O DYE: CPT

## 2025-07-06 PROCEDURE — 70450 CT HEAD/BRAIN W/O DYE: CPT

## 2025-07-06 PROCEDURE — 73502 X-RAY EXAM HIP UNI 2-3 VIEWS: CPT

## 2025-07-06 PROCEDURE — 84484 ASSAY OF TROPONIN QUANT: CPT

## 2025-07-06 RX ORDER — ACETAMINOPHEN 500 MG
1000 TABLET ORAL
Status: COMPLETED | OUTPATIENT
Start: 2025-07-06 | End: 2025-07-06

## 2025-07-06 RX ORDER — ASPIRIN 81 MG/1
324 TABLET, CHEWABLE ORAL ONCE
Status: COMPLETED | OUTPATIENT
Start: 2025-07-06 | End: 2025-07-06

## 2025-07-06 RX ADMIN — ACETAMINOPHEN 1000 MG: 500 TABLET ORAL at 13:17

## 2025-07-06 RX ADMIN — ASPIRIN 324 MG: 81 TABLET, CHEWABLE ORAL at 18:43

## 2025-07-06 ASSESSMENT — PAIN DESCRIPTION - LOCATION
LOCATION: HIP

## 2025-07-06 ASSESSMENT — PAIN - FUNCTIONAL ASSESSMENT
PAIN_FUNCTIONAL_ASSESSMENT: ACTIVITIES ARE NOT PREVENTED
PAIN_FUNCTIONAL_ASSESSMENT: 0-10
PAIN_FUNCTIONAL_ASSESSMENT: ACTIVITIES ARE NOT PREVENTED

## 2025-07-06 ASSESSMENT — PAIN DESCRIPTION - DESCRIPTORS
DESCRIPTORS: ACHING;DISCOMFORT
DESCRIPTORS: ACHING;DISCOMFORT
DESCRIPTORS: THROBBING

## 2025-07-06 ASSESSMENT — LIFESTYLE VARIABLES
HOW OFTEN DO YOU HAVE A DRINK CONTAINING ALCOHOL: NEVER
HOW MANY STANDARD DRINKS CONTAINING ALCOHOL DO YOU HAVE ON A TYPICAL DAY: PATIENT DOES NOT DRINK

## 2025-07-06 ASSESSMENT — PAIN DESCRIPTION - ORIENTATION
ORIENTATION: LEFT

## 2025-07-06 ASSESSMENT — PAIN DESCRIPTION - ONSET
ONSET: ON-GOING
ONSET: ON-GOING

## 2025-07-06 ASSESSMENT — PAIN SCALES - GENERAL
PAINLEVEL_OUTOF10: 8
PAINLEVEL_OUTOF10: 6
PAINLEVEL_OUTOF10: 4

## 2025-07-06 ASSESSMENT — PAIN DESCRIPTION - FREQUENCY
FREQUENCY: CONTINUOUS
FREQUENCY: CONTINUOUS

## 2025-07-06 ASSESSMENT — PAIN DESCRIPTION - PAIN TYPE
TYPE: ACUTE PAIN
TYPE: ACUTE PAIN

## 2025-07-06 NOTE — ED TRIAGE NOTES
Patient arrives to the ED by EMS from home for complaints of L hip pain. Patient reports falling but did not hit her head. Denies blood thinners.

## 2025-07-06 NOTE — ED NOTES
TRANSFER - OUT REPORT:    Verbal report given to Vernell Petit RN on Nilam Reeves  being transferred to [facility] for routine progression of patient care       Report consisted of patient's Situation, Background, Assessment and   Recommendations(SBAR).     Information from the following report(s) Nurse Handoff Report, Intake/Output, MAR, Recent Results, and Cardiac Rhythm NSR was reviewed with the receiving nurse.    [place] Fall Assessment:    Presents to emergency department  because of falls (Syncope, seizure, or loss of consciousness): Yes  Age > 70: Yes  Altered Mental Status, Intoxication with alcohol or substance confusion (Disorientation, impaired judgment, poor safety awaremess, or inability to follow instructions): Yes  Impaired Mobility: Ambulates or transfers with assistive devices or assistance; Unable to ambulate or transer.: Yes  Nursing Judgement: Yes          Lines:       Opportunity for questions and clarification was provided.      Patient transported with:  Monitor and O2 @ 4lpm

## 2025-07-06 NOTE — ED PROVIDER NOTES
signed by FATOUMATA RICHARD      XR CHEST PORTABLE   Final Result   Medial left mid lung and left basilar patchy air space disease.          Electronically signed by Jacobo Swan MD      XR HIP 2-3 VW W PELVIS LEFT   Final Result      1. Age-indeterminate left superior and inferior pubic rami fractures.   2. 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   3. Severe osteopenia.      Electronically signed by Olu Lorenz MD           LABS:  Labs Reviewed   CBC WITH AUTO DIFFERENTIAL - Abnormal; Notable for the following components:       Result Value    Hemoglobin 11.4 (*)     RDW 15.0 (*)     Neutrophils % 80.6 (*)     Lymphocytes % 11.8 (*)     Immature Granulocytes % 0.6 (*)     Immature Granulocytes Absolute 0.05 (*)     All other components within normal limits   COMPREHENSIVE METABOLIC PANEL - Abnormal; Notable for the following components:    BUN 32 (*)     BUN/Creatinine Ratio 36 (*)     Est, Glom Filt Rate 59 (*)     AST 46 (*)     Albumin 3.4 (*)     Albumin/Globulin Ratio 0.9 (*)     All other components within normal limits   TROPONIN - Abnormal; Notable for the following components:    Troponin, High Sensitivity 733 (*)     All other components within normal limits   BRAIN NATRIURETIC PEPTIDE - Abnormal; Notable for the following components:    NT Pro-BNP 2,527 (*)     All other components within normal limits   EXTRA TUBES HOLD       All other labs were within normal range or not returned as of this dictation.    EMERGENCY DEPARTMENT COURSE and DIFFERENTIAL DIAGNOSIS/MDM:   Vitals:    Vitals:    07/06/25 1226   BP: (!) 148/71   Pulse: 73   Resp: 16   Temp: 98.2 °F (36.8 °C)   TempSrc: Oral   SpO2: 94%   Height: 1.6 m (5' 3\")           Medical Decision Making  Amount and/or Complexity of Data Reviewed  Labs: ordered.  Radiology: ordered.  ECG/medicine tests: ordered.    Risk  OTC drugs.            REASSESSMENT      4:48 PM  Family updated on results.  Trop 733.  Daughter states that it was [phone]  [phone]

## 2025-07-08 LAB
EKG ATRIAL RATE: 79 BPM
EKG DIAGNOSIS: NORMAL
EKG P AXIS: 4 DEGREES
EKG P-R INTERVAL: 162 MS
EKG Q-T INTERVAL: 454 MS
EKG QRS DURATION: 134 MS
EKG QTC CALCULATION (BAZETT): 520 MS
EKG R AXIS: 103 DEGREES
EKG T AXIS: -37 DEGREES
EKG VENTRICULAR RATE: 79 BPM

## 2025-07-08 PROCEDURE — 93010 ELECTROCARDIOGRAM REPORT: CPT | Performed by: INTERNAL MEDICINE
